# Patient Record
Sex: MALE | Race: OTHER | Employment: FULL TIME | URBAN - METROPOLITAN AREA
[De-identification: names, ages, dates, MRNs, and addresses within clinical notes are randomized per-mention and may not be internally consistent; named-entity substitution may affect disease eponyms.]

---

## 2022-11-17 ENCOUNTER — APPOINTMENT (OUTPATIENT)
Dept: GENERAL RADIOLOGY | Facility: HOSPITAL | Age: 67
End: 2022-11-17
Attending: EMERGENCY MEDICINE
Payer: COMMERCIAL

## 2022-11-17 ENCOUNTER — HOSPITAL ENCOUNTER (EMERGENCY)
Facility: HOSPITAL | Age: 67
Discharge: HOME | End: 2022-11-18
Attending: EMERGENCY MEDICINE
Payer: COMMERCIAL

## 2022-11-17 DIAGNOSIS — T18.9XXA SWALLOWED FOREIGN BODY, INITIAL ENCOUNTER: Primary | ICD-10-CM

## 2022-11-17 PROCEDURE — 99283 EMERGENCY DEPT VISIT LOW MDM: CPT

## 2022-11-17 PROCEDURE — 71045 X-RAY EXAM CHEST 1 VIEW: CPT | Performed by: EMERGENCY MEDICINE

## 2022-11-17 PROCEDURE — 74018 RADEX ABDOMEN 1 VIEW: CPT | Performed by: EMERGENCY MEDICINE

## 2022-11-17 RX ORDER — GLIPIZIDE 10 MG/1
TABLET ORAL
COMMUNITY

## 2022-11-18 VITALS
SYSTOLIC BLOOD PRESSURE: 149 MMHG | RESPIRATION RATE: 17 BRPM | DIASTOLIC BLOOD PRESSURE: 83 MMHG | WEIGHT: 195 LBS | HEART RATE: 69 BPM | TEMPERATURE: 97 F | OXYGEN SATURATION: 97 %

## 2022-11-18 NOTE — ED INITIAL ASSESSMENT (HPI)
Patient presents to the ED stating that he swallowed his upper partial dental bridge a couple hours ago. Pt able to speak in complete sentences. Pt in no respiratory distress. Pt does not feel like it is stuck in his throat.

## 2022-12-16 ENCOUNTER — OFFICE VISIT (OUTPATIENT)
Dept: GASTROENTEROLOGY | Facility: CLINIC | Age: 67
End: 2022-12-16
Payer: COMMERCIAL

## 2022-12-16 ENCOUNTER — TELEPHONE (OUTPATIENT)
Dept: GASTROENTEROLOGY | Facility: CLINIC | Age: 67
End: 2022-12-16

## 2022-12-16 VITALS
BODY MASS INDEX: 28.53 KG/M2 | HEART RATE: 75 BPM | DIASTOLIC BLOOD PRESSURE: 88 MMHG | HEIGHT: 69 IN | SYSTOLIC BLOOD PRESSURE: 137 MMHG | WEIGHT: 192.63 LBS

## 2022-12-16 DIAGNOSIS — Z12.11 COLON CANCER SCREENING: Primary | ICD-10-CM

## 2022-12-16 DIAGNOSIS — Z12.11 ENCOUNTER FOR SCREENING COLONOSCOPY: Primary | ICD-10-CM

## 2022-12-16 DIAGNOSIS — Z86.010 PERSONAL HISTORY OF COLONIC POLYPS: ICD-10-CM

## 2022-12-16 PROCEDURE — 3008F BODY MASS INDEX DOCD: CPT | Performed by: INTERNAL MEDICINE

## 2022-12-16 PROCEDURE — S0285 CNSLT BEFORE SCREEN COLONOSC: HCPCS | Performed by: INTERNAL MEDICINE

## 2022-12-16 PROCEDURE — 3075F SYST BP GE 130 - 139MM HG: CPT | Performed by: INTERNAL MEDICINE

## 2022-12-16 PROCEDURE — 3079F DIAST BP 80-89 MM HG: CPT | Performed by: INTERNAL MEDICINE

## 2022-12-16 RX ORDER — PIOGLITAZONEHYDROCHLORIDE 45 MG/1
45 TABLET ORAL DAILY
COMMUNITY

## 2022-12-16 RX ORDER — EXENATIDE 2 MG/.85ML
2 INJECTION, SUSPENSION, EXTENDED RELEASE SUBCUTANEOUS WEEKLY
COMMUNITY
Start: 2022-11-16

## 2022-12-16 RX ORDER — ERGOCALCIFEROL (VITAMIN D2) 1250 MCG
50000 CAPSULE ORAL WEEKLY
COMMUNITY

## 2022-12-16 RX ORDER — LISINOPRIL 10 MG/1
10 TABLET ORAL DAILY
COMMUNITY

## 2022-12-16 RX ORDER — ASPIRIN 81 MG/1
81 TABLET ORAL DAILY
COMMUNITY

## 2022-12-16 RX ORDER — LOVASTATIN 20 MG/1
20 TABLET ORAL NIGHTLY
COMMUNITY
Start: 2022-10-06

## 2022-12-16 RX ORDER — POLYETHYLENE GLYCOL 3350, SODIUM SULFATE ANHYDROUS, SODIUM BICARBONATE, SODIUM CHLORIDE, POTASSIUM CHLORIDE 236; 22.74; 6.74; 5.86; 2.97 G/4L; G/4L; G/4L; G/4L; G/4L
4 POWDER, FOR SOLUTION ORAL ONCE
Qty: 1 EACH | Refills: 0 | Status: SHIPPED | OUTPATIENT
Start: 2022-12-16 | End: 2022-12-16

## 2023-05-04 ENCOUNTER — SURGERY CENTER DOCUMENTATION (OUTPATIENT)
Dept: SURGERY | Age: 68
End: 2023-05-04

## 2023-05-05 ENCOUNTER — MED REC SCAN ONLY (OUTPATIENT)
Facility: CLINIC | Age: 68
End: 2023-05-05

## 2023-05-30 ENCOUNTER — OFFICE VISIT (OUTPATIENT)
Dept: INTERNAL MEDICINE CLINIC | Facility: CLINIC | Age: 68
End: 2023-05-30

## 2023-05-30 VITALS
HEIGHT: 69 IN | OXYGEN SATURATION: 96 % | DIASTOLIC BLOOD PRESSURE: 78 MMHG | WEIGHT: 198 LBS | HEART RATE: 74 BPM | SYSTOLIC BLOOD PRESSURE: 132 MMHG | BODY MASS INDEX: 29.33 KG/M2

## 2023-05-30 DIAGNOSIS — Z13.0 SCREENING FOR DEFICIENCY ANEMIA: ICD-10-CM

## 2023-05-30 DIAGNOSIS — E78.2 MIXED HYPERLIPIDEMIA: ICD-10-CM

## 2023-05-30 DIAGNOSIS — E55.9 VITAMIN D DEFICIENCY: ICD-10-CM

## 2023-05-30 DIAGNOSIS — Z12.5 SCREENING FOR PROSTATE CANCER: ICD-10-CM

## 2023-05-30 DIAGNOSIS — Z13.89 SCREENING FOR NEPHROPATHY: ICD-10-CM

## 2023-05-30 DIAGNOSIS — E53.8 VITAMIN B12 DEFICIENCY: ICD-10-CM

## 2023-05-30 DIAGNOSIS — Z00.00 ANNUAL PHYSICAL EXAM: Primary | ICD-10-CM

## 2023-05-30 DIAGNOSIS — Z13.29 SCREENING FOR THYROID DISORDER: ICD-10-CM

## 2023-05-30 DIAGNOSIS — E11.65 TYPE 2 DIABETES MELLITUS WITH HYPERGLYCEMIA, WITHOUT LONG-TERM CURRENT USE OF INSULIN (HCC): ICD-10-CM

## 2023-05-30 DIAGNOSIS — I10 PRIMARY HYPERTENSION: ICD-10-CM

## 2023-05-30 PROCEDURE — 3078F DIAST BP <80 MM HG: CPT | Performed by: INTERNAL MEDICINE

## 2023-05-30 PROCEDURE — 3075F SYST BP GE 130 - 139MM HG: CPT | Performed by: INTERNAL MEDICINE

## 2023-05-30 PROCEDURE — 99387 INIT PM E/M NEW PAT 65+ YRS: CPT | Performed by: INTERNAL MEDICINE

## 2023-05-30 PROCEDURE — 3008F BODY MASS INDEX DOCD: CPT | Performed by: INTERNAL MEDICINE

## 2023-05-30 RX ORDER — INSULIN DEGLUDEC INJECTION 100 U/ML
INJECTION, SOLUTION SUBCUTANEOUS
COMMUNITY
Start: 2023-04-14 | End: 2023-05-30

## 2023-05-30 RX ORDER — INSULIN DEGLUDEC INJECTION 100 U/ML
25 INJECTION, SOLUTION SUBCUTANEOUS DAILY
Qty: 22.5 ML | Refills: 3 | Status: SHIPPED | OUTPATIENT
Start: 2023-05-30 | End: 2023-08-28

## 2023-05-30 NOTE — PATIENT INSTRUCTIONS
- You were seen in clinic for regular annual check-up. We have ordered labs for you and we will call you with the results. Please obtain the bloodwork fasting for 12 hours. OK to drink water the day of your blood draw. We have the lab downstairs on the first floor. No appointment is necessary. Lab hours are Monday-Friday 7:30 AM to 4:45 PM.  There may be Saturday hours 7 AM-11:00 AM as well. Otherwise you can obtain the blood tests on the weekends at the main hospital or local immediate care/urgent care within the [facility]. -We will request the records from your prior PCP, Dr. Lolita Martins  - Let's plan to increase your Tresiba to 25 U once a day. We discussed the possibility of starting short acting insulin in the future if needed. Let's continue with your other medications in the meantime  -You may be due for eye examination for monitoring of yearly diabetic eye checks - please make an appointment with Dr. Sd Serra  - Your next colonoscopy will be due in 10 years with Dr. Drake Lee  -Please continue checking your blood pressures at home and notify us if they are increasing   - please continue checking your blood sugars at home and notify us if they are increasing  - Vaccines may be due for: Tetanus/Tdap,We recommended checking with your insurance for coverage of Shingrix, a 2-dose shingles vaccine that can be obtained at the pharmacy if there is adequate coverage through your insurance plan. - Please continue to eat a varied diet including recommended servings of vegetables, fruits, and low fat dairy. Minimize high saturated fats (such as fast foods) and high sugar intake (such as soda)  - We recommend 150 minutes of moderate intensity exercise (brisk walking, swimming) weekly to maintain your current weight. Targeted weight loss will require more vigorous exercise or more than 150 minutes/week.     Return to clinic in 3-4 months for follow-up

## 2024-01-10 ENCOUNTER — TELEPHONE (OUTPATIENT)
Dept: INTERNAL MEDICINE CLINIC | Facility: CLINIC | Age: 69
End: 2024-01-10

## 2024-01-10 NOTE — TELEPHONE ENCOUNTER
Patient left a voicemail requesting refills    Metformin  Farxiga  Freestyle Amena 2 Sensor  Insulin    [facility]

## 2024-01-11 ENCOUNTER — TELEPHONE (OUTPATIENT)
Dept: INTERNAL MEDICINE CLINIC | Facility: CLINIC | Age: 69
End: 2024-01-11

## 2024-01-12 RX ORDER — LOVASTATIN 20 MG/1
20 TABLET ORAL NIGHTLY
Qty: 90 TABLET | Refills: 3 | Status: SHIPPED | OUTPATIENT
Start: 2024-01-12

## 2024-01-12 RX ORDER — INSULIN DEGLUDEC INJECTION 100 U/ML
25 INJECTION, SOLUTION SUBCUTANEOUS DAILY
Qty: 24 ML | Refills: 3 | Status: SHIPPED | OUTPATIENT
Start: 2024-01-12 | End: 2025-01-30

## 2024-01-12 RX ORDER — LISINOPRIL 10 MG/1
10 TABLET ORAL DAILY
Qty: 90 TABLET | Refills: 3 | Status: SHIPPED | OUTPATIENT
Start: 2024-01-12

## 2024-02-12 ENCOUNTER — LAB ENCOUNTER (OUTPATIENT)
Dept: LAB | Age: 69
End: 2024-02-12
Attending: INTERNAL MEDICINE
Payer: COMMERCIAL

## 2024-02-12 DIAGNOSIS — E11.65 TYPE 2 DIABETES MELLITUS WITH HYPERGLYCEMIA, WITHOUT LONG-TERM CURRENT USE OF INSULIN (HCC): ICD-10-CM

## 2024-02-12 DIAGNOSIS — E55.9 VITAMIN D DEFICIENCY: ICD-10-CM

## 2024-02-12 DIAGNOSIS — E53.8 VITAMIN B12 DEFICIENCY: ICD-10-CM

## 2024-02-12 DIAGNOSIS — Z13.29 SCREENING FOR THYROID DISORDER: ICD-10-CM

## 2024-02-12 DIAGNOSIS — Z00.00 ANNUAL PHYSICAL EXAM: ICD-10-CM

## 2024-02-12 DIAGNOSIS — Z13.89 SCREENING FOR NEPHROPATHY: ICD-10-CM

## 2024-02-12 DIAGNOSIS — Z12.5 SCREENING FOR PROSTATE CANCER: ICD-10-CM

## 2024-02-12 DIAGNOSIS — Z13.0 SCREENING FOR DEFICIENCY ANEMIA: ICD-10-CM

## 2024-02-12 DIAGNOSIS — E78.2 MIXED HYPERLIPIDEMIA: ICD-10-CM

## 2024-02-12 LAB
ALBUMIN SERPL-MCNC: 4.3 G/DL (ref 3.2–4.8)
ALBUMIN/GLOB SERPL: 1.6 {RATIO} (ref 1–2)
ALP LIVER SERPL-CCNC: 62 U/L
ALT SERPL-CCNC: 9 U/L
ANION GAP SERPL CALC-SCNC: 7 MMOL/L (ref 0–18)
AST SERPL-CCNC: 14 U/L (ref ?–34)
BASOPHILS # BLD AUTO: 0.02 X10(3) UL (ref 0–0.2)
BASOPHILS NFR BLD AUTO: 0.3 %
BILIRUB SERPL-MCNC: 0.8 MG/DL (ref 0.2–1.1)
BILIRUB UR QL: NEGATIVE
BUN BLD-MCNC: 20 MG/DL (ref 9–23)
BUN/CREAT SERPL: 18.7 (ref 10–20)
CALCIUM BLD-MCNC: 9.7 MG/DL (ref 8.7–10.4)
CHLORIDE SERPL-SCNC: 108 MMOL/L (ref 98–112)
CHOLEST SERPL-MCNC: 154 MG/DL (ref ?–200)
CLARITY UR: CLEAR
CO2 SERPL-SCNC: 29 MMOL/L (ref 21–32)
COMPLEXED PSA SERPL-MCNC: 3.12 NG/ML (ref ?–4)
CREAT BLD-MCNC: 1.07 MG/DL
CREAT UR-SCNC: 55.2 MG/DL
DEPRECATED RDW RBC AUTO: 44 FL (ref 35.1–46.3)
EGFRCR SERPLBLD CKD-EPI 2021: 76 ML/MIN/1.73M2 (ref 60–?)
EOSINOPHIL # BLD AUTO: 0.22 X10(3) UL (ref 0–0.7)
EOSINOPHIL NFR BLD AUTO: 3.7 %
ERYTHROCYTE [DISTWIDTH] IN BLOOD BY AUTOMATED COUNT: 12.8 % (ref 11–15)
EST. AVERAGE GLUCOSE BLD GHB EST-MCNC: 217 MG/DL (ref 68–126)
FASTING PATIENT LIPID ANSWER: YES
FASTING STATUS PATIENT QL REPORTED: YES
GLOBULIN PLAS-MCNC: 2.7 G/DL (ref 2.8–4.4)
GLUCOSE BLD-MCNC: 163 MG/DL (ref 70–99)
GLUCOSE UR-MCNC: >1000 MG/DL
HBA1C MFR BLD: 9.2 % (ref ?–5.7)
HCT VFR BLD AUTO: 45 %
HDLC SERPL-MCNC: 51 MG/DL (ref 40–59)
HGB BLD-MCNC: 14.4 G/DL
HGB UR QL STRIP.AUTO: NEGATIVE
IMM GRANULOCYTES # BLD AUTO: 0.01 X10(3) UL (ref 0–1)
IMM GRANULOCYTES NFR BLD: 0.2 %
KETONES UR-MCNC: NEGATIVE MG/DL
LDLC SERPL CALC-MCNC: 86 MG/DL (ref ?–100)
LEUKOCYTE ESTERASE UR QL STRIP.AUTO: NEGATIVE
LYMPHOCYTES # BLD AUTO: 1.46 X10(3) UL (ref 1–4)
LYMPHOCYTES NFR BLD AUTO: 24.7 %
MCH RBC QN AUTO: 30 PG (ref 26–34)
MCHC RBC AUTO-ENTMCNC: 32 G/DL (ref 31–37)
MCV RBC AUTO: 93.8 FL
MICROALBUMIN UR-MCNC: 2.4 MG/DL
MICROALBUMIN/CREAT 24H UR-RTO: 43.5 UG/MG (ref ?–30)
MONOCYTES # BLD AUTO: 0.52 X10(3) UL (ref 0.1–1)
MONOCYTES NFR BLD AUTO: 8.8 %
NEUTROPHILS # BLD AUTO: 3.67 X10 (3) UL (ref 1.5–7.7)
NEUTROPHILS # BLD AUTO: 3.67 X10(3) UL (ref 1.5–7.7)
NEUTROPHILS NFR BLD AUTO: 62.3 %
NITRITE UR QL STRIP.AUTO: NEGATIVE
NONHDLC SERPL-MCNC: 103 MG/DL (ref ?–130)
OSMOLALITY SERPL CALC.SUM OF ELEC: 304 MOSM/KG (ref 275–295)
PH UR: 5 [PH] (ref 5–8)
PLATELET # BLD AUTO: 241 10(3)UL (ref 150–450)
POTASSIUM SERPL-SCNC: 4.3 MMOL/L (ref 3.5–5.1)
PROT SERPL-MCNC: 7 G/DL (ref 5.7–8.2)
PROT UR-MCNC: NEGATIVE MG/DL
RBC # BLD AUTO: 4.8 X10(6)UL
SODIUM SERPL-SCNC: 144 MMOL/L (ref 136–145)
SP GR UR STRIP: 1.03 (ref 1–1.03)
TRIGL SERPL-MCNC: 92 MG/DL (ref 30–149)
TSI SER-ACNC: 1.91 MIU/ML (ref 0.55–4.78)
UROBILINOGEN UR STRIP-ACNC: NORMAL
VIT B12 SERPL-MCNC: >2000 PG/ML (ref 211–911)
VIT D+METAB SERPL-MCNC: 69.8 NG/ML (ref 30–100)
VLDLC SERPL CALC-MCNC: 15 MG/DL (ref 0–30)
WBC # BLD AUTO: 5.9 X10(3) UL (ref 4–11)

## 2024-02-12 PROCEDURE — 85025 COMPLETE CBC W/AUTO DIFF WBC: CPT

## 2024-02-12 PROCEDURE — 3061F NEG MICROALBUMINURIA REV: CPT | Performed by: INTERNAL MEDICINE

## 2024-02-12 PROCEDURE — 82570 ASSAY OF URINE CREATININE: CPT

## 2024-02-12 PROCEDURE — 82306 VITAMIN D 25 HYDROXY: CPT

## 2024-02-12 PROCEDURE — 83036 HEMOGLOBIN GLYCOSYLATED A1C: CPT

## 2024-02-12 PROCEDURE — 82607 VITAMIN B-12: CPT

## 2024-02-12 PROCEDURE — 3046F HEMOGLOBIN A1C LEVEL >9.0%: CPT | Performed by: INTERNAL MEDICINE

## 2024-02-12 PROCEDURE — 81003 URINALYSIS AUTO W/O SCOPE: CPT

## 2024-02-12 PROCEDURE — 82043 UR ALBUMIN QUANTITATIVE: CPT

## 2024-02-12 PROCEDURE — 80053 COMPREHEN METABOLIC PANEL: CPT

## 2024-02-12 PROCEDURE — 36415 COLL VENOUS BLD VENIPUNCTURE: CPT

## 2024-02-12 PROCEDURE — 80061 LIPID PANEL: CPT

## 2024-02-12 PROCEDURE — 3060F POS MICROALBUMINURIA REV: CPT | Performed by: INTERNAL MEDICINE

## 2024-02-12 PROCEDURE — 84443 ASSAY THYROID STIM HORMONE: CPT

## 2024-02-18 NOTE — PATIENT INSTRUCTIONS
Seen in clinic today for follow-up.  Today, we focused on your type 2 diabetes as it remains poorly controlled at this time with A1c 9.2%  - We discussed increasing your insulin and will increase to tresiba 20 units twice a day  - You may be due for eye examination with Dr. Wells  - Continue checking your blood sugars at home  - We have referred you to endocrinology, Dr. Kingsley    Continue checking your blood pressures at home, notify us if increasing    We recommended checking with your insurance for coverage of Shingrix, a 2-dose shingles vaccine that can be obtained at the pharmacy if there is adequate coverage through your insurance plan.    Return to clinic in 3-4 months for annual physical examination

## 2024-02-18 NOTE — PROGRESS NOTES
Chief Complaint:   Chief Complaint   Patient presents with    Checkup     Patient c/o neck pain since the weekend with [place] and dizziness after waking up.  Pain 1/10 to neck           HPI:     Mr. RAMOS is a 68 year old male PMHX type 2 diabetes, hypertension, hyperlipidemia, glaucoma coming in for follow-up.    He had an episode on the neck. Twisted and the room was spinning. He had neck pain. This was the first time this ever has had this.  Still has some residual neck pain, more so to the right lateral area.  Still has good range of motion, no changes in vision.  He does have hearing aids that he got from a year ago but this has been stable.    He has no carbs, and exercises frequently. 3-4x exercises. He is close to achieving his .     He is using a freestyle eloisa 1x - only 1 sugar.  He is a bit frustrated as he is doing the right things in terms of exercise, minimizing carbs but has not had difficulty with controlling his sugars.    Past Medical History:   Diagnosis Date    Diabetes (formerly [facility])     Diabetes 1.5, managed as type 2 (formerly [facility])     Essential hypertension     Hyperlipidemia      Past Surgical History:   Procedure Laterality Date    COLONOSCOPY      in [place]    EGD      25 yrs ago at [facility]     Social History:  Social History     Socioeconomic History    Marital status: Single   Tobacco Use    Smoking status: Never    Smokeless tobacco: Never   Vaping Use    Vaping Use: Never used   Substance and Sexual Activity    Alcohol use: Never    Drug use: Never     Family History:  Family History   Problem Relation Age of Onset    Heart Disease Mother         late 80s    Diabetes Mother     Other (Other) Father         blood disorder: AMDS    Diabetes Maternal Grandmother      Allergies:  No Known Allergies  Current Meds:  Current Outpatient Medications   Medication Sig Dispense Refill    metFORMIN HCl 1000 MG Oral Tab Take 1 tablet (1,000 mg total) by mouth 2 (two) times daily with meals. 180  tablet 3    dapagliflozin 10 MG Oral Tab Farxiga 10 mg tablet. TAKE 1 TABLET BY MOUTH ONCE DAILY 90 tablet 3    Continuous Blood Gluc Sensor (FREESTYLE NORBERTO 2 SENSOR) Does not apply Misc Apply 1 Application topically every 14 (fourteen) days. 12 each 3    lisinopril 10 MG Oral Tab Take 1 tablet (10 mg total) by mouth daily. 90 tablet 3    Lovastatin 20 MG Oral Tab Take 1 tablet (20 mg total) by mouth nightly. 90 tablet 3    Insulin Degludec (TRESIBA FLEXTOUCH) 100 UNIT/ML Subcutaneous Solution Pen-injector Inject 0.25 mL (25 Units total) into the skin daily. 24 mL 3    Cyanocobalamin (B-12 OR) Take by mouth.      aspirin 81 MG Oral Tab EC Take 1 tablet (81 mg total) by mouth daily.      ergocalciferol 1.25 MG (41834 UT) Oral Cap Take 1 capsule (50,000 Units total) by mouth once a week.        Counseling given: Not Answered       REVIEW OF SYSTEMS:   Positive Findings indicated in BOLD    Constitutional: Fever, Chills, Weight Gain, Weight Loss, Night Sweats, Fatigue, Malaise  ENT/Mouth:  Hearing Changes, Ear Pain, Nasal Congestion, Sinus Pain, Hoarseness, Sore throat, Rhinorrhea, Swallowing Difficulty  Eyes: Eye Pain, Swelling, Redness, Foreign Body, Discharge, Vision Changes  Cardiovascular: Chest Pain, SOB, PND, Dyspnea on Exertion, Orthopnea, Claudication, Edema, Palpitations  Respiratory: Cough, Sputum, Wheezing, Shortness of breath  Gastrointestinal: Nausea, Vomiting, Diarrhea, Constipation, Pain, Heartburn, Dysphagia, Bloody stools, Tarry stools  Genitourinary: Dysmenorrhea, Dysuria, Urinary Frequency, Hematuria, Urinary Incontinence, Urgency,  Flank Pain  Musculoskeletal: Arthralgias, Myalgias, Joint Swelling, Joint Stiffness, Back Pain, Neck Pain  Integumentary: Skin Lesions, Pruritis, Hair Changes, Jaundice, Nail changes  Neuro: Weakness, Numbness, Paresthesias, Loss of Consciousness, Syncope, Dizziness, Headache, Falls  Psych: Anxiety, Depression, Insomnia, Suicidal Ideation, Homicidal ideation, Memory  Changes  Heme/Lymph: Bruising, Bleeding, Lymphadenopathy  Endocrine: Polyuria, Polydipsia, Temperature Intolerance    EXAM:   Vital Signs:  Blood pressure 128/68, pulse 71, temperature 98.2 °F (36.8 °C), height 5' 9\" (1.753 m), weight 202 lb (91.6 kg), SpO2 98%.     Constitutional: No acute distress. Alert and oriented x 3.  Eyes: EOMI, PERRLA, clear sclera b/l  HENT: NCAT, Moist mucous membranes, Oropharynx without erythema or exudates  Neck: No JVD, no thyromegaly; Full AROM  Cardiovascular: S1, S2, no S3, no S4, Regular rate and rhythm, No murmurs/gallops/rubs.   Vascular: Equal pulses 2+ carotids no bruits or thrills/radial/DP/PT bilaterally  Respiratory: Clear to auscultation bilaterally.  No wheezes/rales/rhonchi  Gastrointestinal: Soft, nontender, nondistended. Positive bowel sounds x 4. No rebound tenderness. No hepatomegaly, No splenomegaly  Genitourinary: No CVA tenderness bilaterally  Neurologic: No focal neurological deficits, CN II-XII intact  Musculoskeletal: Full range of motion of all extremities, no clubbing/swelling/edema  Skin: No lesions, No erythema, no jaundice, Cap Refill < 2s  Psychiatric: Appropriate mood and affect  Heme/Lymph/Immune: No cervical LAD      DATA REVIEWED   Labs:  Recent Results (from the past 8760 hour(s))   Comp Metabolic Panel (14)    Collection Time: 02/12/24  8:17 AM   Result Value Ref Range    Glucose 163 (H) 70 - 99 mg/dL    Sodium 144 136 - 145 mmol/L    Potassium 4.3 3.5 - 5.1 mmol/L    Chloride 108 98 - 112 mmol/L    CO2 29.0 21.0 - 32.0 mmol/L    Anion Gap 7 0 - 18 mmol/L    BUN 20 9 - 23 mg/dL    Creatinine 1.07 0.70 - 1.30 mg/dL    BUN/CREA Ratio 18.7 10.0 - 20.0    Calcium, Total 9.7 8.7 - 10.4 mg/dL    Calculated Osmolality 304 (H) 275 - 295 mOsm/kg    eGFR-Cr 76 >=60 mL/min/1.73m2    ALT 9 (L) 10 - 49 U/L    AST 14 <=34 U/L    Alkaline Phosphatase 62 45 - 117 U/L    Bilirubin, Total 0.8 0.2 - 1.1 mg/dL    Total Protein 7.0 5.7 - 8.2 g/dL    Albumin 4.3 3.2 -  4.8 g/dL    Globulin  2.7 (L) 2.8 - 4.4 g/dL    A/G Ratio 1.6 1.0 - 2.0    Patient Fasting for CMP? Yes      *Note: Due to a large number of results and/or encounters for the requested time period, some results have not been displayed. A complete set of results can be found in Results Review.       Recent Results (from the past 8760 hour(s))   CBC W/ DIFFERENTIAL    Collection Time: 02/12/24  8:17 AM   Result Value Ref Range    WBC 5.9 4.0 - 11.0 x10(3) uL    RBC 4.80 3.80 - 5.80 x10(6)uL    HGB 14.4 13.0 - 17.5 g/dL    HCT 45.0 39.0 - 53.0 %    MCV 93.8 80.0 - 100.0 fL    MCH 30.0 26.0 - 34.0 pg    MCHC 32.0 31.0 - 37.0 g/dL    RDW-SD 44.0 35.1 - 46.3 fL    RDW 12.8 11.0 - 15.0 %    .0 150.0 - 450.0 10(3)uL    Neutrophil Absolute Prelim 3.67 1.50 - 7.70 x10 (3) uL    Neutrophil Absolute 3.67 1.50 - 7.70 x10(3) uL    Lymphocyte Absolute 1.46 1.00 - 4.00 x10(3) uL    Monocyte Absolute 0.52 0.10 - 1.00 x10(3) uL    Eosinophil Absolute 0.22 0.00 - 0.70 x10(3) uL    Basophil Absolute 0.02 0.00 - 0.20 x10(3) uL    Immature Granulocyte Absolute 0.01 0.00 - 1.00 x10(3) uL    Neutrophil % 62.3 %    Lymphocyte % 24.7 %    Monocyte % 8.8 %    Eosinophil % 3.7 %    Basophil % 0.3 %    Immature Granulocyte % 0.2 %     *Note: Due to a large number of results and/or encounters for the requested time period, some results have not been displayed. A complete set of results can be found in Results Review.           ASSESSMENT AND PLAN:     DM2  Recent A1c:   HgbA1C (%)   Date Value   02/12/2024 9.2 (H)     Recent urine microalbumin: No components found for: \"MICROALB/CREAT RATIO\"  Current medications: metformin 1000 mg, dapagliflozin 10 mg once a day, pioglitazone 45 mg daily, Tresiba 25 units once a day  Eye exam: Will see Dr. Wells  Foot exam: Check feet daily  - Microalbumin to creatinine ratio 43.5, should improve with greater diabetic control  -As he is doing all the right things with nutrition and exercise, we will try  to increase Tresiba to 20 units twice a day which she is amenable to  - Will refer to endocrinology, Dr. Kingsley     Hypertension  -Blood pressure today at goal  - Check blood pressures at home  - Continue with home lisinopril 10 mg daily     Hyperlipidemia  - Repeat fasting lipid panel overall well-controlled  - Continue with lovastatin 20 mg nightly     Glaucoma  Prior history  -Going to follow-up with ophthalmology     Vitamin B-12 deficiency  Noted prior history  - Vitamin B12 greater than 2000, should take vitamin B12 every other day     Vitamin D deficiency  Noted prior history  - Vitamin D at goal             Orders This Visit:  No orders of the defined types were placed in this encounter.      Meds This Visit:  Requested Prescriptions      No prescriptions requested or ordered in this encounter       Imaging & Referrals:  None     Health Maintenance  May be due for diabetic eye examination  Due for shingles vaccine    Spent 30 minutes obtaining history, evaluating patient, discussing treatment options, diet, exercise, review of available labs and radiology reports, and completing documentation.       Return to clinic in 3-4 months for annual physical examination    Fredy Jacobson MD, 02/19/24, 4:15 PM

## 2024-02-19 ENCOUNTER — OFFICE VISIT (OUTPATIENT)
Dept: INTERNAL MEDICINE CLINIC | Facility: CLINIC | Age: 69
End: 2024-02-19

## 2024-02-19 VITALS
HEART RATE: 71 BPM | HEIGHT: 69 IN | BODY MASS INDEX: 29.92 KG/M2 | OXYGEN SATURATION: 98 % | SYSTOLIC BLOOD PRESSURE: 128 MMHG | WEIGHT: 202 LBS | TEMPERATURE: 98 F | DIASTOLIC BLOOD PRESSURE: 68 MMHG

## 2024-02-19 DIAGNOSIS — E53.8 VITAMIN B12 DEFICIENCY: ICD-10-CM

## 2024-02-19 DIAGNOSIS — R42 VERTIGO: ICD-10-CM

## 2024-02-19 DIAGNOSIS — E55.9 VITAMIN D DEFICIENCY: ICD-10-CM

## 2024-02-19 DIAGNOSIS — E78.2 MIXED HYPERLIPIDEMIA: ICD-10-CM

## 2024-02-19 DIAGNOSIS — E11.65 TYPE 2 DIABETES MELLITUS WITH HYPERGLYCEMIA, WITHOUT LONG-TERM CURRENT USE OF INSULIN (HCC): Primary | ICD-10-CM

## 2024-02-19 DIAGNOSIS — M54.2 ACUTE NECK PAIN: ICD-10-CM

## 2024-02-19 DIAGNOSIS — I10 PRIMARY HYPERTENSION: ICD-10-CM

## 2024-02-19 PROCEDURE — 3074F SYST BP LT 130 MM HG: CPT | Performed by: INTERNAL MEDICINE

## 2024-02-19 PROCEDURE — 3008F BODY MASS INDEX DOCD: CPT | Performed by: INTERNAL MEDICINE

## 2024-02-19 PROCEDURE — 3078F DIAST BP <80 MM HG: CPT | Performed by: INTERNAL MEDICINE

## 2024-02-19 PROCEDURE — 99214 OFFICE O/P EST MOD 30 MIN: CPT | Performed by: INTERNAL MEDICINE

## 2024-02-19 RX ORDER — INSULIN DEGLUDEC INJECTION 100 U/ML
20 INJECTION, SOLUTION SUBCUTANEOUS 2 TIMES DAILY
Qty: 36 ML | Refills: 1 | Status: SHIPPED | OUTPATIENT
Start: 2024-02-19 | End: 2024-08-17

## 2024-02-27 ENCOUNTER — TELEPHONE (OUTPATIENT)
Dept: INTERNAL MEDICINE CLINIC | Facility: CLINIC | Age: 69
End: 2024-02-27

## 2024-02-29 ENCOUNTER — PATIENT MESSAGE (OUTPATIENT)
Dept: ADMINISTRATIVE | Age: 69
End: 2024-02-29

## 2024-02-29 NOTE — TELEPHONE ENCOUNTER
Sherri  online to initiate authorization    CTA BRAIN + CTA CAROTIDS (CPT=70496/01221)        Referral #: [id number]      Scheduled For: 03/01/2024    Status: pending authorization > To discuss this case with the reviewer, contact Sherri at [phone]  .    Use Reference Case Number: [id number]             Clinical notes sent for review.     Patient notified of pending status via [employer].     Appt Desk > Noted

## 2024-03-01 ENCOUNTER — TELEPHONE (OUTPATIENT)
Dept: INTERNAL MEDICINE CLINIC | Facility: CLINIC | Age: 69
End: 2024-03-01

## 2024-03-01 ENCOUNTER — PATIENT MESSAGE (OUTPATIENT)
Dept: INTERNAL MEDICINE CLINIC | Facility: CLINIC | Age: 69
End: 2024-03-01

## 2024-03-01 NOTE — TELEPHONE ENCOUNTER
To CHARU Pardo..    I called and spoke with Jonathan at [facility] T# [phone] and inquired why Pt's CTA Brain and CTA Carotids exam was denied today. Per Jonathan diagnosis of dizziness will not suffice. He stated a peer to peer review is required and you can call t# [phone]. You need to include diagnosis of blockage he stated

## 2024-03-01 NOTE — TELEPHONE ENCOUNTER
From: Bg Johnson  To: Fredy Jacobson  Sent: 3/1/2024  1:31 PM CST  Subject: Test declined    My insurance company declined to approve test trying to figure out why

## 2024-03-01 NOTE — TELEPHONE ENCOUNTER
CTA BRAIN + CTA CAROTIDS (CPT=70496/90643)             Referral #: [id number]       Scheduled For:  03/06/2024     Status: Denied > To discuss this case with the reviewer, contact Sherri at [phone]  .     Use Reference Case Number: [id number]       Angiography, Head Rational:  Your doctor told us that you have dizziness. Your doctor ordered a test that takes pictures of the blood vessels in your head. This test is needed when your doctor is looking for certain conditions. These might include a blockage of the blood vessel, bleeding or a stroke. We reviewed the notes we have. The notes do not show that your doctor is looking for any of these conditions. Based on the information we have, this test is not medically necessary. We used [employer] Clinical Guideline titled Vascular Imaging to make this decision. You may view this guideline at [email or web address]-guidelines-radiology.    Angiography, Neck - CT  Rational: Your doctor told us that you have neck pain and dizziness. Your doctor ordered a test that takes pictures of the blood vessels in your neck. This test should be used when your doctor is looking for certain conditions. These might include a blockage of the blood vessel, or stroke. We reviewed the notes we have. The notes do not show that your doctor is looking for any of these conditions. Based on the information we have, this test is not medically necessary. We used [employer] Clinical Guideline titled Vascular Imaging to make this decision. You may view this guideline at [email or web address]/mbm-guidelines-radiology.      Notified clinical staff for follow-up, a copy of the denial letter is filed under the MEDIA tab, un-check \" Clinical Info Only \" to view the determination letter for denial rational and appeal process.     Patient notified of adverse determination via [email or web address].     Appt Desk > Noted

## 2024-03-01 NOTE — TELEPHONE ENCOUNTER
Patient called and states someone from Dr Jacobson office called him but did not leave a message.    Please advise

## 2024-03-01 NOTE — TELEPHONE ENCOUNTER
Called peer to peer line - Dr. Becerra     D/w Dr. Becerra    Authorization: [id number] both head and neck CTA  - Feburay 29-April 28 2024.    Received authorization, needed more clinical information as the clinical note was not fully received.  Discussed that this is concern for vertebral artery dissection.    To the pool, not sure who should receive this information?  Please also notify the patient that it has been approved

## 2024-03-06 ENCOUNTER — HOSPITAL ENCOUNTER (OUTPATIENT)
Dept: CT IMAGING | Facility: HOSPITAL | Age: 69
Discharge: HOME | End: 2024-03-06
Attending: INTERNAL MEDICINE
Payer: COMMERCIAL

## 2024-03-06 DIAGNOSIS — E11.65 TYPE 2 DIABETES MELLITUS WITH HYPERGLYCEMIA, WITHOUT LONG-TERM CURRENT USE OF INSULIN (HCC): ICD-10-CM

## 2024-03-06 DIAGNOSIS — R42 VERTIGO: ICD-10-CM

## 2024-03-06 DIAGNOSIS — M54.2 ACUTE NECK PAIN: ICD-10-CM

## 2024-03-06 PROCEDURE — 70496 CT ANGIOGRAPHY HEAD: CPT | Performed by: INTERNAL MEDICINE

## 2024-03-06 PROCEDURE — 70498 CT ANGIOGRAPHY NECK: CPT | Performed by: INTERNAL MEDICINE

## 2024-03-07 ENCOUNTER — TELEPHONE (OUTPATIENT)
Dept: INTERNAL MEDICINE CLINIC | Facility: CLINIC | Age: 69
End: 2024-03-07

## 2024-03-07 RX ORDER — MECLIZINE HCL 12.5 MG/1
12.5 TABLET ORAL 3 TIMES DAILY PRN
Qty: 30 TABLET | Refills: 0 | Status: SHIPPED | OUTPATIENT
Start: 2024-03-07

## 2024-03-07 NOTE — TELEPHONE ENCOUNTER
Please notify the patient that the CT scan was negative for vascular abnormality.  Specifically no dissection or bleeding.    Please keep a close eye on the dizziness, headaches.  I will prescribe meclizine 12.5 mg up to 3 times a day as needed.  Should only use if he is having the symptoms.  Notify us if still not getting better.

## 2024-03-12 ENCOUNTER — PATIENT MESSAGE (OUTPATIENT)
Dept: INTERNAL MEDICINE CLINIC | Facility: CLINIC | Age: 69
End: 2024-03-12

## 2024-03-12 NOTE — TELEPHONE ENCOUNTER
From: Bg Johnson  To: Fredy Jacobson  Sent: 3/12/2024 11:38 AM CDT  Subject: Neck    Did the CT show any signs of neck bone misalignment should I have an x ray of neck. I am going to physical therapy helps but slow process

## 2024-03-13 RX ORDER — CYCLOBENZAPRINE HCL 5 MG
5 TABLET ORAL 3 TIMES DAILY PRN
Qty: 30 TABLET | Refills: 0 | Status: SHIPPED | OUTPATIENT
Start: 2024-03-13

## 2024-04-02 ENCOUNTER — OFFICE VISIT (OUTPATIENT)
Dept: ENDOCRINOLOGY CLINIC | Facility: CLINIC | Age: 69
End: 2024-04-02
Payer: COMMERCIAL

## 2024-04-02 VITALS
SYSTOLIC BLOOD PRESSURE: 143 MMHG | WEIGHT: 206 LBS | BODY MASS INDEX: 30 KG/M2 | HEART RATE: 69 BPM | DIASTOLIC BLOOD PRESSURE: 82 MMHG

## 2024-04-02 DIAGNOSIS — E11.69 TYPE 2 DIABETES MELLITUS WITH OTHER SPECIFIED COMPLICATION, UNSPECIFIED WHETHER LONG TERM INSULIN USE (HCC): Primary | ICD-10-CM

## 2024-04-02 DIAGNOSIS — E78.5 DYSLIPIDEMIA: ICD-10-CM

## 2024-04-02 LAB
GLUCOSE BLOOD: 134
TEST STRIP LOT #: NORMAL NUMERIC

## 2024-04-02 PROCEDURE — 99244 OFF/OP CNSLTJ NEW/EST MOD 40: CPT | Performed by: INTERNAL MEDICINE

## 2024-04-02 PROCEDURE — 3079F DIAST BP 80-89 MM HG: CPT | Performed by: INTERNAL MEDICINE

## 2024-04-02 PROCEDURE — 95251 CONT GLUC MNTR ANALYSIS I&R: CPT | Performed by: INTERNAL MEDICINE

## 2024-04-02 PROCEDURE — 82947 ASSAY GLUCOSE BLOOD QUANT: CPT | Performed by: INTERNAL MEDICINE

## 2024-04-02 PROCEDURE — 3077F SYST BP >= 140 MM HG: CPT | Performed by: INTERNAL MEDICINE

## 2024-04-02 NOTE — H&P
New Patient Visit - Diabetes    CONSULT - Reason for Visit:  Diabetes management.    Requesting Physician: Dr. Jacobson    CHIEF COMPLAINT:    Chief Complaint   Patient presents with    Diabetes    Consult        HISTORY OF PRESENT ILLNESS:   Bg Johnson is a 68 year old male who is here to establish care for DM.     DM HISTORY  Diagnosed: > 20 years ago      HISTORY OF DIABETES COMPLICATIONS: :  History of Retinopathy: denies - last eye exam : 2023   History of Neuropathy: denies  History of Nephropathy: last Ur MA was high .    ASSOCIATED COMPLICATIONS:   HTN: yes  Hyperlipidemia: yes  Coronary Artery Disease:  denies  Cerebrovascular Disease: denies      HOME GLUCOSE READINGS:   [employer] download reviewed      CURRENT DIABETIC MEDICATIONS INCLUDE:  MTF 1000 mg BF and dinner  Farxiga 10 mg daily   Tresiba 20 units BID--.> States this was increased in Feb 2024    He has been on byetta in the past       MEALS:  Good compliance with meals  Tries to decrease carb content of meals    EXERCISE:  Works out a lot       CURRENT MEDICATIONS:    Current Outpatient Medications   Medication Sig Dispense Refill    cyclobenzaprine 5 MG Oral Tab Take 1 tablet (5 mg total) by mouth 3 (three) times daily as needed for Muscle spasms. 30 tablet 0    meclizine 12.5 MG Oral Tab Take 1 tablet (12.5 mg total) by mouth 3 (three) times daily as needed for Dizziness. 30 tablet 0    insulin degludec (TRESIBA FLEXTOUCH) 100 UNIT/ML Subcutaneous Solution Pen-injector Inject 20 Units into the skin in the morning and 20 Units before bedtime. 36 mL 1    metFORMIN HCl 1000 MG Oral Tab Take 1 tablet (1,000 mg total) by mouth 2 (two) times daily with meals. 180 tablet 3    dapagliflozin 10 MG Oral Tab Farxiga 10 mg tablet. TAKE 1 TABLET BY MOUTH ONCE DAILY 90 tablet 3    Continuous Blood Gluc Sensor (FREESTYLE AMENA 2 SENSOR) Does not apply Misc Apply 1 Application topically every 14 (fourteen) days. 12 each 3    lisinopril 10 MG Oral Tab Take 1 tablet  (10 mg total) by mouth daily. 90 tablet 3    Lovastatin 20 MG Oral Tab Take 1 tablet (20 mg total) by mouth nightly. 90 tablet 3    Cyanocobalamin (B-12 OR) Take by mouth.      aspirin 81 MG Oral Tab EC Take 1 tablet (81 mg total) by mouth daily.      ergocalciferol 1.25 MG (41686 UT) Oral Cap Take 1 capsule (50,000 Units total) by mouth once a week.         PAST MEDICAL HISTORY:   Past Medical History:   Diagnosis Date    Diabetes (HCC)     Diabetes 1.5, managed as type 2 (HCC)     Essential hypertension     Hyperlipidemia        PAST SURGICAL HISTORY:   Past Surgical History:   Procedure Laterality Date    COLONOSCOPY      in [place]    EGD      25 yrs ago at [facility]       ALLERGIES:  No Known Allergies    SOCIAL HISTORY:    Social History     Socioeconomic History    Marital status: Single   Tobacco Use    Smoking status: Never    Smokeless tobacco: Never   Vaping Use    Vaping Use: Never used   Substance and Sexual Activity    Alcohol use: Never    Drug use: Never       FAMILY HISTORY:   Family History   Problem Relation Age of Onset    Heart Disease Mother         late 80s    Diabetes Mother     Other (Other) Father         blood disorder: AMDS    Diabetes Maternal Grandmother        ASSESSMENTS:        REVIEW OF SYSTEMS:  Constitutional: Negative for: weight change, fever, fatigue, cold/heat intolerance  Eyes: Negative for:  Visual changes, proptosis, blurring, floaters, poor night vision, impaired color vision  ENT: Negative for:  dysphagia, neck swelling, dysphonia  Respiratory: Negative for:  dyspnea, cough  Cardiovascular: Negative for:  chest pain, palpitations, orthopnea  GI: Negative for:  abdominal pain, nausea, vomiting, diarrhea, constipation, bleeding  Neurology: Negative for: headache, numbness, weakness,   Genito-Urinary: Negative for: dysuria, frequency  Psychiatric: Negative for:  depression, anxiety  Hematology/Lymphatics: Negative for: bruising, lower extremity edema  Endocrine: Negative  for: polyuria, polydypsia  Skin: Negative for: rash, blister,      PHYSICAL EXAM:   Vitals:    04/02/24 1252   BP: 143/82   Pulse: 69   Weight: 206 lb (93.4 kg)     BMI: Body mass index is 30.42 kg/m².         General Appearance:  alert, well developed, in no acute distress  Head: Atraumatic  Eyes:  normal conjunctivae, sclera., normal sclera and normal pupils  Throat/Neck: normal sound to voice. Normal hearing, normal speech  Respiratory:  Speaking in full sentences, non-labored. no increased work of breathing, no audible wheezing    Neuro: motor grossly intact, moving all extremities without difficulty  Psychiatric:  oriented to time, self, and place    DIABETIC FOOT EXAM: April 2024:   normal monofilament sensation, normal pulses, no edema, no skin lesions      DATA:     Pertinent data reviewed  A1c is 9.2 % ( 4/2024)     ASSESSMENT AND PLAN:    1. Type 2 DM: with hyperglycemia     Plan:  Discussed the pathogenesis, natural course of diabetes. Patient understands the importance of glycemic control and the implications of uncontrolled diabetes including Diabetic ketoacidosis and various micro vascular and macrovascular complications.        a). Medications:    Continuous Glucose Monitoring Interpretation    Bg Johnson has undergone continuous glucose monitoring with the personal dexcom. He was evaluated with the dexcom from 3/20-4/2/2024. .  His blood glucose tracings demonstrated:   Very high 20 %   High 30 %   Target 50 %  Low 0 %   Very low 0 %      As a result of his testing the following plan was made:     Metformin 1000 mg with BF and dinner  Take with food  GI SE reviewed    START Ozempic 0.25 mg weekly x 2 weeks, followed by 0.5 mg weekly after that   Pen teaching provide  No personal or family history of MEN syndrome  Patient counselled regarding side effects including injection site reactions, nausea, vomiting, diarrhea, pancreatitis, gastroparesis and rare side effect angel Mitchell  syndrome.    Farxiga 10 mg daily   Discussed side effects including UTI and fungal infections, skin, genital infections.   Discussed the importance of hydration.      Tresiba 20 units BID--> 15 units BID    Check and call with sugars as discussed   To call if he has low BG under 80    b). Urine microalbumin /creatinine high on recent labs. Will work on BP and BG control Will repeat test  in 5-6 months  c). Discussed importance of annual eye exams  d). Foot exam: Daily feet exam explained  e). BG log maintainence explained in great detail, to get log and glucometer on next visit.  f). Life style changes: Diet: low carbohydrate diet discussed, Exercise: should target a weight loss of 7% and increase exercise to at least 150min a week.  g). Hypoglycemia recognition and management discussed    2. Patient’s BP is normal today  3. Dyslipidemia  A) Discussed lifestyle modifications including reductions in dietary total and saturated fat, weight loss, aerobic exercise, and eating a diet rich in fruits and vegetables.  B) Statin therapy  Discussed the potential side effects of statins including muscle and liver injury.    RTC in 3-4 months  Check and call with sugars as discussed    Orders Placed This Encounter   Procedures    POC HemoCue Glucose 201 (Finger stick glucose)           Bettye Mora MD

## 2024-04-08 ENCOUNTER — PATIENT MESSAGE (OUTPATIENT)
Dept: ENDOCRINOLOGY CLINIC | Facility: CLINIC | Age: 69
End: 2024-04-08

## 2024-04-09 NOTE — TELEPHONE ENCOUNTER
From: Bg Johnson  To: Bettye Mora  Sent: 4/8/2024 11:03 AM CDT  Subject: Drug    Did you send prescription to [facility] for Ozemptic ? Have not received notice to pick-up

## 2024-04-25 ENCOUNTER — TELEPHONE (OUTPATIENT)
Dept: INTERNAL MEDICINE CLINIC | Facility: CLINIC | Age: 69
End: 2024-04-25

## 2024-04-25 ENCOUNTER — PATIENT MESSAGE (OUTPATIENT)
Dept: INTERNAL MEDICINE CLINIC | Facility: CLINIC | Age: 69
End: 2024-04-25

## 2024-04-25 NOTE — TELEPHONE ENCOUNTER
Agree should be seen - unable to see until next week therefore should see associate or urgent care

## 2024-04-25 NOTE — TELEPHONE ENCOUNTER
Patient called back, states he slipped on a wet boat dock about 2 weeks ago, he landed on his buttocks and fell backwards hitting his back. Patient states that he is still having pain in the tailbone and right buttocks area. Pain not as bad but he is concerned that the pain is still there. No bruising per patient. Asking if he needs Xrays or needs to be seen.   To  to please advise

## 2024-04-25 NOTE — TELEPHONE ENCOUNTER
----- Message from Bg Johnson sent at 4/25/2024  9:27 AM CDT -----  Regarding: X-Ray  Contact: [phone]  I feel about two weeks ago and landed on my butt but believe I might of cracked or broken my tail bone.     It is very sore in that area and jut want to know

## 2024-04-30 ENCOUNTER — HOSPITAL ENCOUNTER (OUTPATIENT)
Age: 69
Discharge: HOME | End: 2024-04-30
Payer: COMMERCIAL

## 2024-04-30 ENCOUNTER — APPOINTMENT (OUTPATIENT)
Dept: GENERAL RADIOLOGY | Age: 69
End: 2024-04-30
Attending: PHYSICIAN ASSISTANT
Payer: COMMERCIAL

## 2024-04-30 VITALS
HEART RATE: 72 BPM | TEMPERATURE: 98 F | OXYGEN SATURATION: 98 % | RESPIRATION RATE: 16 BRPM | DIASTOLIC BLOOD PRESSURE: 79 MMHG | SYSTOLIC BLOOD PRESSURE: 148 MMHG
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
## 2024-04-30 DIAGNOSIS — M54.50 LUMBAR BACK PAIN: ICD-10-CM

## 2024-04-30 PROCEDURE — 99203 OFFICE O/P NEW LOW 30 MIN: CPT | Performed by: PHYSICIAN ASSISTANT

## 2024-04-30 PROCEDURE — 72100 X-RAY EXAM L-S SPINE 2/3 VWS: CPT | Performed by: PHYSICIAN ASSISTANT

## 2024-04-30 NOTE — ED INITIAL ASSESSMENT (HPI)
Pt reports slipping on a wet area on his boat dock and landing on his bottom two weeks ago. Pt c/o continued tailbone pain/right sided back pain. States he did hit his head but denies LOC, dizziness/nausea.

## 2024-04-30 NOTE — ED PROVIDER NOTES
Patient Seen in: [facility]      History     Chief Complaint   Patient presents with    Fall     Stated Complaint: Back Pain    Subjective:   HPI    68-year-old male presenting for evaluation of low back pain, tailbone pain.  States he slipped on his boat dock approximately 2 weeks ago landing on his buttock.  States he did bump his head but there was no loss of consciousness.  He has no complaints of headache, dizziness or nausea.  He is not on any blood thinners.  Patient denies numbness or tingling in the groin, urinary retention or incontinence.  Denies any fevers.    Objective:   Past Medical History:    Diabetes (HCC)    Diabetes 1.5, managed as type 2 (HCC)    Essential hypertension    Hyperlipidemia              Past Surgical History:   Procedure Laterality Date    [facility]      [facility]      [facility] yrs ago at [facility]                Social History     Socioeconomic History    Marital status: Single   Tobacco Use    Smoking status: Never    Smokeless tobacco: Never   Vaping Use    Vaping status: Never Used   Substance and Sexual Activity    Alcohol use: Never    Drug use: Never              Review of Systems    Positive for stated complaint: Back Pain  Other systems are as noted in HPI.  Constitutional and vital signs reviewed.      All other systems reviewed and negative except as noted above.    Physical Exam     ED Triage Vitals [04/30/24 1620]   /79   Pulse 72   Resp 16   Temp 97.6 °F (36.4 °C)   Temp src Temporal   SpO2 98 %   O2 Device None (Room air)       Current:/79   Pulse 72   Temp 97.6 °F (36.4 °C) (Temporal)   Resp 16   SpO2 98%         Physical Exam  Vitals and nursing note reviewed.   Constitutional:       General: He is not in acute distress.  HENT:      Head: Normocephalic and atraumatic.      Right Ear: External ear normal.      Left Ear: External ear normal.      Nose: Nose normal.      Mouth/Throat:      Mouth: Mucous membranes are moist.   Eyes:       Extraocular Movements: Extraocular movements intact.      Pupils: Pupils are equal, round, and reactive to light.   Cardiovascular:      Rate and Rhythm: Normal rate.   Pulmonary:      Effort: Pulmonary effort is normal.   Abdominal:      General: Abdomen is flat.   Musculoskeletal:         General: Normal range of motion.      Cervical back: Normal range of motion.        Back:       Comments: Area of tenderness to lumbar paraspinal area   Skin:     General: Skin is warm.   Neurological:      General: No focal deficit present.      Mental Status: He is alert and oriented to person, place, and time.   Psychiatric:         Mood and Affect: Mood normal.         Behavior: Behavior normal.               ED Course   Labs Reviewed - No data to display     58-year-old male presenting for evaluation of low back pain after fall 2 weeks ago.    Ddx-lumbar strain, contusion, lumbar radiculopathy, sacral contusion versus fracture  Patient has no S/SX of cauda equina at this time and is ambulatory with steady gait     X-ray with no evidence of fracture or abnormality.  Discussed continued supportive care, PCP follow-up if the pain does not improve         MDM                                         Medical Decision Making      Disposition and Plan     Clinical Impression:  1. Fall, initial encounter    2. Lumbar back pain         Disposition:  Discharge  4/30/2024  5:29 pm    Follow-up:  Fredy Jacobson MD  [address] [place] [id number]  [phone]                Medications Prescribed:  Current Discharge Medication List

## 2024-05-02 ENCOUNTER — PATIENT MESSAGE (OUTPATIENT)
Dept: ENDOCRINOLOGY CLINIC | Facility: CLINIC | Age: 69
End: 2024-05-02

## 2024-05-02 ENCOUNTER — PATIENT MESSAGE (OUTPATIENT)
Dept: INTERNAL MEDICINE CLINIC | Facility: CLINIC | Age: 69
End: 2024-05-02

## 2024-05-02 NOTE — TELEPHONE ENCOUNTER
From: Bg Johnson  To: Fredy Jacobson  Sent: 5/2/2024 10:04 AM CDT  Subject: X Ray    Thank you for heads up on x-ray went the [facility] and no broken bones just very sore

## 2024-05-02 NOTE — TELEPHONE ENCOUNTER
From: Bg Johnson  To: Bettye Mora  Sent: 5/2/2024 10:28 AM CDT  Subject: Ozemtic     I started at .25 ml every week and having some not very good side affects. I feel full all the time and really have no desire to eat anything. The first injection was ok but on the second one. It took affect. Can only eat extremely small amount without feeling full or bloated.     I have lack of energy. I was due for another injection on Monday and did not do it but still having the affects of being full all the time. It hurts to eat.    Is there a lower lever to dose?    Sugar levels are low and cut back from 20 to 15 units of insulin According to meter within target 75% of the time now. It seems to work there     I feel backwards about two weeks ago and hurt my back nothing broken from the x rays.

## 2024-05-07 ENCOUNTER — TELEPHONE (OUTPATIENT)
Dept: INTERNAL MEDICINE CLINIC | Facility: CLINIC | Age: 69
End: 2024-05-07

## 2024-05-07 NOTE — TELEPHONE ENCOUNTER
Per chart review, pt was seen at urgent care 4/30. MD sent pt a follow up message--refer to 5/2 encounter.

## 2024-10-05 ENCOUNTER — PATIENT MESSAGE (OUTPATIENT)
Dept: INTERNAL MEDICINE CLINIC | Facility: CLINIC | Age: 69
End: 2024-10-05

## 2024-10-07 RX ORDER — INSULIN DEGLUDEC 100 U/ML
25 INJECTION, SOLUTION SUBCUTANEOUS DAILY
COMMUNITY
Start: 2024-07-29 | End: 2024-10-07

## 2024-10-07 NOTE — TELEPHONE ENCOUNTER
Per 4/2/24 ov note Dr. Mora: Luna 20 units BID--> 15 units BID     MyChart to pt to verify dose. Pt overdue for physical.

## 2024-10-07 NOTE — TELEPHONE ENCOUNTER
From: Bg Johnson  To: Fredy Jacobson  Sent: 10/5/2024 4:39 PM CDT  Subject: Insilin    I am running out of insulin since my prescription was for 25 units and as we discussed I am taking 20 units in the morning and 20 units in the evening    Please send [facility] pharmacy a new prescription for Tresiba     I only have one pen left and will last about a week and insurance will not refill until 10/15

## 2024-10-08 RX ORDER — INSULIN DEGLUDEC 100 U/ML
20 INJECTION, SOLUTION SUBCUTANEOUS 2 TIMES DAILY
Qty: 45 ML | Refills: 0 | Status: SHIPPED | OUTPATIENT
Start: 2024-10-08

## 2024-10-08 NOTE — TELEPHONE ENCOUNTER
Patient called to check status no refill    He has a couple days left of insulin    Check his blood sugars but does not a log with the readings

## 2025-01-14 ENCOUNTER — APPOINTMENT (OUTPATIENT)
Dept: GENERAL RADIOLOGY | Age: 70
End: 2025-01-14
Attending: NURSE PRACTITIONER
Payer: COMMERCIAL

## 2025-01-14 ENCOUNTER — HOSPITAL ENCOUNTER (OUTPATIENT)
Age: 70
Discharge: HOME | End: 2025-01-14
Payer: COMMERCIAL

## 2025-01-14 VITALS
OXYGEN SATURATION: 100 % | SYSTOLIC BLOOD PRESSURE: 156 MMHG | DIASTOLIC BLOOD PRESSURE: 82 MMHG | TEMPERATURE: 99 F | HEART RATE: 78 BPM | RESPIRATION RATE: 19 BRPM

## 2025-01-14 DIAGNOSIS — S69.92XA INJURY OF LEFT WRIST, INITIAL ENCOUNTER: ICD-10-CM

## 2025-01-14 DIAGNOSIS — S52.502A CLOSED FRACTURE OF DISTAL END OF LEFT RADIUS, UNSPECIFIED FRACTURE MORPHOLOGY, INITIAL ENCOUNTER: Primary | ICD-10-CM

## 2025-01-14 PROCEDURE — 73110 X-RAY EXAM OF WRIST: CPT | Performed by: NURSE PRACTITIONER

## 2025-01-14 RX ORDER — INSULIN DEGLUDEC 100 U/ML
20 INJECTION, SOLUTION SUBCUTANEOUS 2 TIMES DAILY
Qty: 45 ML | Refills: 0 | Status: SHIPPED | OUTPATIENT
Start: 2025-01-14

## 2025-01-14 RX ORDER — ACETAMINOPHEN 500 MG
1000 TABLET ORAL ONCE
Status: COMPLETED | OUTPATIENT
Start: 2025-01-14 | End: 2025-01-14

## 2025-01-14 NOTE — ED INITIAL ASSESSMENT (HPI)
Patient c/o left wrist and fore arm pain and swelling due to a fall this morning. Patient denies head injury.

## 2025-01-14 NOTE — DISCHARGE INSTRUCTIONS
As discussed, it does look like you have a fracture of your distal radius.  However, no displacement.  You were placed in a temporary cast and given a sling.    The sling only needs to be worn while you are up, awake, alert, active.  Remove while sleeping and resting.  Elevate extremity while sleeping and resting.  Tylenol as needed for pain.    I have given you the name of 2 different orthopedic specialist that you can follow-up with within the next 72 hours.    If you have any worsening pain, discoloration of your fingers, swelling of your fingers, temperature change of your fingers, please remove Ace bandage wrap and cast and go to emergency room.

## 2025-01-14 NOTE — TELEPHONE ENCOUNTER
Refill request is for a maintenance medication and has met the criteria specified in the Ambulatory Medication Refill Standing Order for eligibility, visits, laboratory, alerts and was sent to the requested pharmacy.    Requested Prescriptions     Signed Prescriptions Disp Refills    insulin degludec (TRESIBA FLEXTOUCH) 100 UNIT/ML Subcutaneous Solution Pen-injector 45 mL 0     Sig: Inject 20 Units into the skin in the morning and 20 Units before bedtime.     Authorizing Provider: YUNIER CONNELL     Ordering User: ANURAG ROMERO pt will be due for physical

## 2025-01-15 NOTE — ED PROVIDER NOTES
Patient Seen in: [facility]      History     Chief Complaint   Patient presents with    Arm or Hand Injury     Stated Complaint: Fall, left arm pain    Subjective: This is a 69-year-old male, past medical history of hypertension, diabetes type 2, hyperlipidemia, presents to immediate care clinic for evaluation of trip and fall that happened this morning.  Patient states he fell outside on the ice.  He landed on his left wrist.  However, denies FOOSH like mechanism.  Left-hand-dominant.  Patient states initially his mid forearm was painful but he thought he may have hit forearm on shovel.  It was until he was out to lunch with his sister that he noticed his wrist was swollen and bruised.  Patient rates pain 5 out of 10.  No obvious asymmetry or deformity.  He did not hit his head during the fall.  No LOC.  No previous injury to left upper extremity.  Well-appearing.  Good radial pulse.  Good cap refill.  AOx4.  The history is provided by the patient.             Objective:     Past Medical History:    Diabetes (HCC)    Diabetes 1.5, managed as type 2 (HCC)    Essential hypertension    Hyperlipidemia              Past Surgical History:   Procedure Laterality Date    Colonoscopy      in [place]    Egd      25 yrs ago at [facility]                No pertinent social history.            Review of Systems   Constitutional: Negative.    Musculoskeletal:  Positive for arthralgias and joint swelling.   Skin:  Positive for color change.   Neurological: Negative.        Positive for stated complaint: Fall, left arm pain  Other systems are as noted in HPI.  Constitutional and vital signs reviewed.      All other systems reviewed and negative except as noted above.    Physical Exam     ED Triage Vitals   BP 01/14/25 1638 156/82   Pulse 01/14/25 1637 89   Resp 01/14/25 1636 19   Temp 01/14/25 1636 98.6 °F (37 °C)   Temp src 01/14/25 1636 Oral   SpO2 01/14/25 1636 100 %   O2 Device 01/14/25 1636 None (Room air)        Current Vitals:   Vital Signs  BP: 156/82  Pulse: 78  Resp: 19  Temp: 98.6 °F (37 °C)  Temp src: Oral    Oxygen Therapy  SpO2: 100 %  O2 Device: None (Room air)        Physical Exam  Constitutional:       General: He is not in acute distress.     Appearance: Normal appearance. He is not ill-appearing.   Cardiovascular:      Rate and Rhythm: Normal rate.      Pulses: Normal pulses.   Pulmonary:      Effort: Pulmonary effort is normal.   Musculoskeletal:         General: Swelling and tenderness present.      Left wrist: Swelling, tenderness and bony tenderness present. No snuff box tenderness. Decreased range of motion.        Arms:    Skin:     General: Skin is warm.      Capillary Refill: Capillary refill takes less than 2 seconds.      Findings: Bruising present.   Neurological:      General: No focal deficit present.      Mental Status: He is alert and oriented to person, place, and time.             ED Course   Labs Reviewed - No data to display     XR WRIST COMPLETE (MIN 3 VIEWS), LEFT (CPT=73110)    Result Date: 1/14/2025  CONCLUSION:   Acute, nondisplaced fracture involving the distal radius.  No dislocation.     Dictated by (CST): Elgin Blanco MD on 1/14/2025 at 5:26 PM     Finalized by (CST): Elgin Blanco MD on 1/14/2025 at 5:30 PM                    MDM      Differentials Consider include: Distal radius fracture, ulnar styloid fracture, callus fracture, fracture with displacement, wrist sprain.    Patient does have soft tissue swelling and ecchymosis to distal radius.  He is tender at distal radius.  No volar deformity.  No dinner fork deformity.  Nontender ulnar styloid.  Nontender to midshaft forearm.  X-ray of forearm withheld.    Patient has no snuffbox tenderness on examination.    Positive limited range of motion.  Pain with flexion.  Minimal pain with supination and pronation and extension.  Strong radial pulse.  Good cap refill.    X-ray obtained.  X-ray independently reviewed by  provider.  There is a positive distal radius fracture without displacement.  No deformity.  X-ray independently reviewed by provider.    Patient was placed in sugar-tong postmold and sling provided.  Postmold was checked by provider after application.  No neurovascular compromise.  Good cap refill.  Sensation to distal fingers intact.    Patient is aware that the garment needs to be worn while up, awake, alert, active.  He is aware to remove while sleeping and resting.  Extremity while sleeping and resting.    Encourage patient to take Tylenol as needed for pain alleviation.  He declined opioids.    Did give the name of 2 orthopedic specialist that he can follow-up with.    Patient is aware of signs and symptoms that warrant immediate ER evaluation.  He verbalized understanding agrees with plan of care.        Medical Decision Making  Amount and/or Complexity of Data Reviewed  Radiology: ordered and independent interpretation performed. Decision-making details documented in ED Course.        Disposition and Plan     Clinical Impression:  1. Closed fracture of distal end of left radius, unspecified fracture morphology, initial encounter    2. Injury of left wrist, initial encounter         Disposition:  Discharge  1/14/2025  5:57 pm    Follow-up:  Poppy Wilcox PA  [address] [place] [address] [id number] [phone]    Schedule an appointment as soon as possible for a visit   This is an orthopedic physician i would like you to follow up with    Blas Diaz MD  [address] [facility] [address] [phone]    Schedule an appointment as soon as possible for a visit   This is an orthopedic specialist I would like you to follow up with          Medications Prescribed:  Discharge Medication List as of 1/14/2025  5:57 PM              Supplementary Documentation:

## 2025-02-02 ENCOUNTER — PATIENT MESSAGE (OUTPATIENT)
Dept: INTERNAL MEDICINE CLINIC | Facility: CLINIC | Age: 70
End: 2025-02-02

## 2025-02-04 NOTE — TELEPHONE ENCOUNTER
To Front dek - can use Same Day Sick slot on 2/10 or use 2/11 1:30 pm hold slot. Let me know what he se

## 2025-02-05 ENCOUNTER — OFFICE VISIT (OUTPATIENT)
Dept: NEUROLOGY | Facility: CLINIC | Age: 70
End: 2025-02-05
Payer: COMMERCIAL

## 2025-02-05 VITALS — BODY MASS INDEX: 29.62 KG/M2 | HEIGHT: 69 IN | WEIGHT: 200 LBS

## 2025-02-05 DIAGNOSIS — R42 VERTIGO: Primary | ICD-10-CM

## 2025-02-05 DIAGNOSIS — G45.9 TIA (TRANSIENT ISCHEMIC ATTACK): ICD-10-CM

## 2025-02-05 DIAGNOSIS — M54.2 NECK PAIN: ICD-10-CM

## 2025-02-05 PROCEDURE — 3008F BODY MASS INDEX DOCD: CPT | Performed by: OTHER

## 2025-02-05 PROCEDURE — 99204 OFFICE O/P NEW MOD 45 MIN: CPT | Performed by: OTHER

## 2025-02-05 NOTE — PROGRESS NOTES
[address], [address]  [facility] [address]  [phone]            Neurology Initial Visit     Referred By: Dr. Reese ref. provider found    Chief Complaint:   Chief Complaint   Patient presents with    Neurologic Problem     Patient presents here today to establish care, patient was self referred to evaluate and treat cervical vertigo, patient c/o on 1/30/2025 went to ED at [facility], patient states he lost balance, nausea, and felt to room spinning.          HPI:     Bg Johnson is a 69 year old male, who presents for vertigo.   The patient presents with recurrent episodes of vertigo. The most recent episode occurred last Thursday, when the patient experienced sudden onset of severe vertigo upon exiting his vehicle, nearly causing him to collapse. The episode was accompanied by nausea and vomiting, persisting for 3-4 hours. The patient called emergency services and was transported to [facility].    The patient reports a similar episode approximately one year ago, which was associated with extreme neck pain. The neck pain persisted for about 4 months, eventually resolving after rehabilitation. A third episode occurred prior to these, also resolving within 30 minutes but followed by prolonged neck pain.    The patient denies any specific triggers for these episodes and reports no pain associated with the most recent event. He notes feeling delirious during the acute phase. The patient also mentions having reduced hearing in his left ear compared to the right, for which he uses hearing aids. An MRI for acoustic neuroma was performed 20 years ago.    Regarding ENT, the left ear is weaker in hearing than the right ear.    Past Medical History:    Diabetes (HCC)    Diabetes 1.5, managed as type 2 (HCC)    Essential hypertension    Hyperlipidemia       Past Surgical History:   Procedure Laterality Date    Colonoscopy      in [place]    Egd      25 yrs  ago at [facility]       Social history:  History   Smoking Status    Never   Smokeless Tobacco    Never     History   Alcohol Use Never     History   Drug Use Unknown       Family History   Problem Relation Age of Onset    Heart Disease Mother         late 80s    Diabetes Mother     Other (Other) Father         blood disorder: AMDS    Diabetes Maternal Grandmother          Current Outpatient Medications:     insulin degludec (TRESIBA FLEXTOUCH) 100 UNIT/ML Subcutaneous Solution Pen-injector, Inject 20 Units into the skin in the morning and 20 Units before bedtime., Disp: 45 mL, Rfl: 0    meclizine 12.5 MG Oral Tab, Take 1 tablet (12.5 mg total) by mouth 3 (three) times daily as needed for Dizziness., Disp: 30 tablet, Rfl: 0    metFORMIN HCl 1000 MG Oral Tab, Take 1 tablet (1,000 mg total) by mouth 2 (two) times daily with meals., Disp: 180 tablet, Rfl: 3    dapagliflozin 10 MG Oral Tab, Farxiga 10 mg tablet. TAKE 1 TABLET BY MOUTH ONCE DAILY, Disp: 90 tablet, Rfl: 3    Continuous Blood Gluc Sensor (FREESTYLE NORBERTO 2 SENSOR) Does not apply Misc, Apply 1 Application topically every 14 (fourteen) days., Disp: 12 each, Rfl: 3    lisinopril 10 MG Oral Tab, Take 1 tablet (10 mg total) by mouth daily., Disp: 90 tablet, Rfl: 3    Lovastatin 20 MG Oral Tab, Take 1 tablet (20 mg total) by mouth nightly., Disp: 90 tablet, Rfl: 3    Cyanocobalamin (B-12 OR), Take by mouth., Disp: , Rfl:     aspirin 81 MG Oral Tab EC, Take 1 tablet (81 mg total) by mouth daily., Disp: , Rfl:     ergocalciferol 1.25 MG (59092 UT) Oral Cap, Take 1 capsule (50,000 Units total) by mouth once a week., Disp: , Rfl:     semaglutide 2 MG/3ML Subcutaneous Solution Pen-injector, Inject 0.25 mg into the skin once a week for 14 days, THEN 0.5 mg once a week., Disp: 9 mL, Rfl: 0    cyclobenzaprine 5 MG Oral Tab, Take 1 tablet (5 mg total) by mouth 3 (three) times daily as needed for Muscle spasms. (Patient not taking: Reported on 2/5/2025), Disp: 30 tablet,  Rfl: 0    Allergies[1]    ROS:   As in HPI, the rest of the 14 system review was done and was negative      Physical Exam:  Vitals:    02/05/25 0959   Weight: 200 lb (90.7 kg)   Height: 69\"       General: No apparent distress, well nourished, well groomed.  Head- Normocephalic, atraumatic  Eyes- No redness or swelling  ENT- Hearing intake, normal glutition  Neck- No masses or adenopathy  Cv: pulses were palpable and normal, no cyanosis or edema     Neurological:     Mental Status- Alert and oriented x3.  Normal attention span and concentration  Thought process intact  Memory intact- recent and remote  Mood intact  Fund of knowledge appropriate for education and age    Language intact including: comprehension, naming, repetition, vocabulary    Cranial Nerves:  II.- Visual fields full to confrontation    III, IV, VI- EOM intact, OLAF  V. Facial sensation intact  VII. Face symmetric, no facial weakness  VIII. Hearing intact to whisper.  IX. Pallet elevates symmetrically.  XI. Shoulder shrug is intact  XII. Tongue is midline    Motor Exam:  Muscle tone normal  No atrophy or fasciculations  Strength- upper extremities 5/5 proximally and distally                  - lower  extremities 5/5 proximally and distally    Sensory Exam:  Light touch sensation- intact in all 4 extremities    Coordination:  Finger to nose intact  Rapid alternating movements intact    Gait:  Normal posture  Normal physiologic      Labs:    Lab Results   Component Value Date    TSH 1.907 02/12/2024     Lab Results   Component Value Date    HDL 51 02/12/2024    LDL 86 02/12/2024    TRIG 92 02/12/2024     Lab Results   Component Value Date    HGB 14.4 02/12/2024    HCT 45.0 02/12/2024    MCV 93.8 02/12/2024    WBC 5.9 02/12/2024    .0 02/12/2024      Lab Results   Component Value Date    BUN 20 02/12/2024    CA 9.7 02/12/2024    ALT 9 (L) 02/12/2024    AST 14 02/12/2024    ALB 4.3 02/12/2024     02/12/2024    K 4.3 02/12/2024      02/12/2024    CO2 29.0 02/12/2024      I have reviewed labs.      Assessment   1. Vertigo  Recurrent vertigo:  Patient presents with recurrent episodes of vertigo, with the most recent severe episode occurring last Thursday. The episode was characterized by sudden onset of severe dizziness, near-collapse, nausea, and vomiting, lasting approximately 3-4 hours. This is the third occurrence, with previous episodes about a year ago.  - MRI of brain to rule out stroke and check for acoustic neuroma  - CT angiogram of neck and brain to evaluate for vertebral artery dissection and other vascular abnormalities  - Referral to ENT (Dr. Babs Marie) for evaluation of possible inner ear disorders  - Cardiac monitoring to rule out cardiac causes of potential embolic events  - Continue aspirin for stroke prevention  - Follow-up appointment in one month    Hearing loss:  Patient reports left ear hearing weaker than right, currently using hearing aids. History of MRI for acoustic neuroma 20 years ago.  - MRI of brain (as mentioned above) to re-evaluate for acoustic neuroma  - ENT evaluation (as mentioned above) for further assessment of hearing loss    Neck pain:  Patient reports history of severe neck pain associated with first vertigo episode, lasting 4 months. Physical therapy was helpful in managing symptoms.  - Continue physical therapy for neck pain management  - Consider referral to physical therapist in [place] if needed  - CARD ECHO 2D W DOPPLER/BUBBLES (CPT=93306); Future  - CARD ZIO EXTENDED MONITOR 8-14 DAYS (CPT=93246/84500); Future  - CTA BRAIN + CTA CAROTIDS (CPT=70496/96235); Future  - Lipid Panel; Future  - MRI BRAIN (CPT=70551); Future    2. TIA (transient ischemic attack)  Continue with aspirin and treatment of underlying risk factors.    - CARD ECHO 2D W DOPPLER/BUBBLES (CPT=93306); Future  - CARD ZIO EXTENDED MONITOR 8-14 DAYS (CPT=93246/69457); Future  - CTA BRAIN + CTA CAROTIDS (CPT=70496/70920);  Future  - Lipid Panel; Future  - MRI BRAIN (CPT=70551); Future    3. Neck pain    Patient is already working with physical therapy.           Education and counseling provided to patient. Instructed patient to call my office or seek medical attention immediately if symptoms worsen.  Patient verbalized understanding of information given. All questions were answered. All side effects of drugs were discussed.       Return to clinic in: Return in about 4 weeks (around 3/5/2025).    Austyn Chavez MD           [1] No Known Allergies

## 2025-02-10 ENCOUNTER — OFFICE VISIT (OUTPATIENT)
Dept: OTOLARYNGOLOGY | Facility: CLINIC | Age: 70
End: 2025-02-10
Payer: COMMERCIAL

## 2025-02-10 ENCOUNTER — OFFICE VISIT (OUTPATIENT)
Dept: ENDOCRINOLOGY CLINIC | Facility: CLINIC | Age: 70
End: 2025-02-10
Payer: COMMERCIAL

## 2025-02-10 VITALS — WEIGHT: 200 LBS | BODY MASS INDEX: 29.62 KG/M2 | HEIGHT: 69 IN

## 2025-02-10 VITALS
BODY MASS INDEX: 30.21 KG/M2 | HEART RATE: 76 BPM | SYSTOLIC BLOOD PRESSURE: 121 MMHG | DIASTOLIC BLOOD PRESSURE: 66 MMHG | WEIGHT: 204 LBS | HEIGHT: 69 IN

## 2025-02-10 DIAGNOSIS — R42 VERTIGO: Primary | ICD-10-CM

## 2025-02-10 DIAGNOSIS — Z13.820 OSTEOPOROSIS SCREENING: ICD-10-CM

## 2025-02-10 DIAGNOSIS — R42 LIGHTHEADEDNESS: ICD-10-CM

## 2025-02-10 DIAGNOSIS — E11.21 DIABETIC NEPHROPATHY ASSOCIATED WITH TYPE 2 DIABETES MELLITUS (HCC): ICD-10-CM

## 2025-02-10 DIAGNOSIS — E11.69 TYPE 2 DIABETES MELLITUS WITH OTHER SPECIFIED COMPLICATION, UNSPECIFIED WHETHER LONG TERM INSULIN USE (HCC): Primary | ICD-10-CM

## 2025-02-10 DIAGNOSIS — E78.5 DYSLIPIDEMIA: ICD-10-CM

## 2025-02-10 DIAGNOSIS — E55.9 VITAMIN D DEFICIENCY: ICD-10-CM

## 2025-02-10 DIAGNOSIS — T14.8XXA FRACTURE: ICD-10-CM

## 2025-02-10 DIAGNOSIS — H91.8X3 ASYMMETRICAL HEARING LOSS: ICD-10-CM

## 2025-02-10 LAB
GLUCOSE BLOOD: 108
HEMOGLOBIN A1C: 7.8 % (ref 4.3–5.6)
TEST STRIP LOT #: NORMAL NUMERIC

## 2025-02-10 PROCEDURE — 3008F BODY MASS INDEX DOCD: CPT | Performed by: SPECIALIST

## 2025-02-10 PROCEDURE — 99203 OFFICE O/P NEW LOW 30 MIN: CPT | Performed by: SPECIALIST

## 2025-02-10 RX ORDER — DAPAGLIFLOZIN 10 MG/1
TABLET, FILM COATED ORAL
Qty: 90 TABLET | Refills: 1 | Status: SHIPPED | OUTPATIENT
Start: 2025-02-10

## 2025-02-10 RX ORDER — GLIPIZIDE 5 MG/1
2.5 TABLET ORAL
Qty: 45 TABLET | Refills: 0 | Status: SHIPPED | OUTPATIENT
Start: 2025-02-10

## 2025-02-10 RX ORDER — INSULIN DEGLUDEC 100 U/ML
18 INJECTION, SOLUTION SUBCUTANEOUS 2 TIMES DAILY
Qty: 32.4 ML | Refills: 0 | Status: SHIPPED | OUTPATIENT
Start: 2025-02-10 | End: 2025-05-11

## 2025-02-10 NOTE — PROGRESS NOTES
FU VISIT     CHIEF COMPLAINT:    Chief Complaint   Patient presents with    Diabetes     Follow up and glucose check     Refill Request     All medications         HISTORY OF PRESENT ILLNESS:   Bg Johnson is a 69 year old male who is here to FU for DM.     DM HISTORY  Diagnosed: > 20 years ago      HISTORY OF DIABETES COMPLICATIONS: :  History of Retinopathy: denies, has cataracts - last eye exam :  June 2024  History of Neuropathy: denies  History of Nephropathy: last Ur MA was high .    ASSOCIATED COMPLICATIONS:   HTN: yes  Hyperlipidemia: yes  Coronary Artery Disease:  denies  Cerebrovascular Disease: denies      HOME GLUCOSE READINGS:   Amena download reviewed      CURRENT DIABETIC MEDICATIONS INCLUDE:  MTF 1000 mg BF and dinner  Farxiga 10 mg daily   Tresiba 20 units BID    He has been on byetta in the past   Was prescribed ozempic in the past --> stopped since he did not feel good on it      MEALS:  Good compliance with meals  Tries to decrease carb content of meals    EXERCISE:  Works out a lot , but limited now due to recent wrist fracture      CURRENT MEDICATIONS:    Current Outpatient Medications   Medication Sig Dispense Refill    insulin degludec (TRESIBA FLEXTOUCH) 100 UNIT/ML Subcutaneous Solution Pen-injector Inject 18 Units into the skin in the morning and 18 Units before bedtime. 32.4 mL 0    dapagliflozin 10 MG Oral Tab Farxiga 10 mg tablet. TAKE 1 TABLET BY MOUTH ONCE DAILY 90 tablet 1    metFORMIN HCl 1000 MG Oral Tab Take 1 tablet (1,000 mg total) by mouth 2 (two) times daily with meals. 180 tablet 1    glipiZIDE 5 MG Oral Tab Take 0.5 tablets (2.5 mg total) by mouth every morning before breakfast. 45 tablet 0    cyclobenzaprine 5 MG Oral Tab Take 1 tablet (5 mg total) by mouth 3 (three) times daily as needed for Muscle spasms. 30 tablet 0    meclizine 12.5 MG Oral Tab Take 1 tablet (12.5 mg total) by mouth 3 (three) times daily as needed for Dizziness. 30 tablet 0    Continuous Blood Gluc  Sensor (FREESTYLE NORBERTO 2 SENSOR) Does not apply Misc Apply 1 Application topically every 14 (fourteen) days. 12 each 3    lisinopril 10 MG Oral Tab Take 1 tablet (10 mg total) by mouth daily. 90 tablet 3    Lovastatin 20 MG Oral Tab Take 1 tablet (20 mg total) by mouth nightly. 90 tablet 3    Cyanocobalamin (B-12 OR) Take by mouth.      aspirin 81 MG Oral Tab EC Take 1 tablet (81 mg total) by mouth daily.      ergocalciferol 1.25 MG (50602 UT) Oral Cap Take 1 capsule (50,000 Units total) by mouth once a week.         PAST MEDICAL HISTORY:   Past Medical History:    Diabetes (HCC)    Diabetes 1.5, managed as type 2 (HCC)    Essential hypertension    Hyperlipidemia       PAST SURGICAL HISTORY:   Past Surgical History:   Procedure Laterality Date    Colonoscopy      in [place]    Egd      25 yrs ago at [facility]       ALLERGIES:  No Known Allergies    SOCIAL HISTORY:    Social History     Socioeconomic History    Marital status: Single   Tobacco Use    Smoking status: Never    Smokeless tobacco: Never   Vaping Use    Vaping status: Never Used   Substance and Sexual Activity    Alcohol use: Never    Drug use: Never       FAMILY HISTORY:   Family History   Problem Relation Age of Onset    Heart Disease Mother         late 80s    Diabetes Mother     Other (Other) Father         blood disorder: AMDS    Diabetes Maternal Grandmother        ASSESSMENTS:        REVIEW OF SYSTEMS:  Constitutional: Negative for: weight change, fever, fatigue, cold/heat intolerance  Eyes: Negative for:  Visual changes, proptosis, blurring, floaters, poor night vision, impaired color vision  ENT: Negative for:  dysphagia, neck swelling, dysphonia  Respiratory: Negative for:  dyspnea, cough  Cardiovascular: Negative for:  chest pain, palpitations, orthopnea  GI: Negative for:  abdominal pain, nausea, vomiting, diarrhea, constipation, bleeding  Neurology: Negative for: headache, numbness, weakness,   Genito-Urinary: Negative for: dysuria,  frequency  Psychiatric: Negative for:  depression, anxiety  Hematology/Lymphatics: Negative for: bruising, lower extremity edema  Endocrine: Negative for: polyuria, polydypsia  Skin: Negative for: rash, blister,      PHYSICAL EXAM:   Vitals:    02/10/25 1134   BP: 121/66   Pulse: 76   Weight: 204 lb (92.5 kg)   Height: 5' 9\" (1.753 m)     BMI: Body mass index is 30.13 kg/m².         General Appearance:  alert, well developed, in no acute distress  Head: Atraumatic  Eyes:  normal conjunctivae, sclera., normal sclera and normal pupils  Throat/Neck: normal sound to voice. Normal hearing, normal speech  Respiratory:  Speaking in full sentences, non-labored. no increased work of breathing, no audible wheezing    Neuro: motor grossly intact, moving all extremities without difficulty  Psychiatric:  oriented to time, self, and place    DIABETIC FOOT EXAM: April 2024:   normal monofilament sensation, normal pulses, no edema, no skin lesions      DATA:     Pertinent data reviewed  A1c is 7.8 % ( 2/2025)     ASSESSMENT AND PLAN:    1. Type 2 DM: with hyperglycemia     Plan:  Discussed the pathogenesis, natural course of diabetes. Patient understands the importance of glycemic control and the implications of uncontrolled diabetes including Diabetic ketoacidosis and various micro vascular and macrovascular complications.        a). Medications:    Continuous Glucose Monitoring Interpretation    Bg Johnson has undergone continuous glucose monitoring with the personal dexcom. He was evaluated with the dexcom from1/28/2025 - 02/10/2025 (14 days)  Generated: 02/10/2025  Time CGM Active: 83%        Glucose Statistics and Targets  Average Glucose: 159 mg/dL  Glucose Management Indicator (GMI): 7.1%  Glucose Variability (%CV): 29.7%  Target Range: 70 - 180 mg/dL        Time in Ranges  Very High: >250 mg/dL --- 4%  High: 181 - 250 mg/dL --- 25%  Target Range: 70 - 180 mg/dL --- 71%  Low: 54 - 69 mg/dL --- 0%  Very Low: <54  mg/dL --- 0%   As a result of his testing the following plan was made:     A1c is better than before but still above goal   I discussed GLP agonist again, however, he does not want to try this class of medications  Noted PP hyperglycemia, discussed adding a low dose of glipizide with BF only  and cutting back on long acting insulin some     START glipizide 2.5 mg with BF only   Take with food  Reviewed risk of hypoglycemia    Metformin 1000 mg with BF and dinner  Take with food  GI SE reviewed      Farxiga 10 mg daily   Discussed side effects including UTI and fungal infections, skin, genital infections.   Discussed the importance of hydration.      Tresiba 20 units BID--> 18 units BID     Check and call with sugars as discussed   To call if he has low BG under 80    b). Urine microalbumin /creatinine high on recent labs. Will work on BP and BG control Will repeat level  c). Discussed importance of annual eye exams  d). Foot exam: Daily feet exam explained  e). BG log maintainence explained in great detail, to get log and glucometer on next visit.  f). Life style changes: Diet: low carbohydrate diet discussed, Exercise: should target a weight loss of 7% and increase exercise to at least 150min a week.  g). Hypoglycemia recognition and management discussed    2. Patient’s BP is normal today  3. Dyslipidemia  A) Discussed lifestyle modifications including reductions in dietary total and saturated fat, weight loss, aerobic exercise, and eating a diet rich in fruits and vegetables.  B) Statin therapy  Discussed the potential side effects of statins including muscle and liver injury.  4. Wrist fracture  Fell from standing height . Slipped on ice  DXA ordered   Vitamin D ordered   5. Reports two episodes of vertigo, passed out last time  Is undergoing GONZALEZ with specialists  Will check a 7-8 am cortisol level     RTC in 3-4 months  Check and call with sugars as discussed    Orders Placed This Encounter   Procedures    POC  HemoCue Glucose 201 (Finger stick glucose)    POC Glycohemoglobin [30348]    Microalb/Creat Ratio, Random Urine [E]    Lipid Panel [E]    Vitamin D [E]    Cortisol           Bettye Mora MD

## 2025-02-10 NOTE — PROGRESS NOTES
Name: Bg Johnson  YOB: 1955  Report Period: 01/28/2025 - 02/10/2025 (14 days)  Generated: 02/10/2025  Time CGM Active: 83%      Glucose Statistics and Targets  Average Glucose: 159 mg/dL  Glucose Management Indicator (GMI): 7.1%  Glucose Variability (%CV): 29.7%  Target Range: 70 - 180 mg/dL      Time in Ranges  Very High: >250 mg/dL --- 4%  High: 181 - 250 mg/dL --- 25%  Target Range: 70 - 180 mg/dL --- 71%  Low: 54 - 69 mg/dL --- 0%  Very Low: <54 mg/dL --- 0%

## 2025-02-11 NOTE — PROGRESS NOTES
Bg Johnson is a 69 year old male.   Chief Complaint   Patient presents with    New Patient    Dizziness     Vertigo       HPI:   Patient here has had longstanding hearing loss left greater than right.  Had vertigo about 1 year ago and then most recently within the past 1 week.  This started when he got out of his truck and was associated with nausea and vomiting.  It lasted hours.  He went to the emergency room where his glucose and blood pressure was noted to be within normal range.    Current Outpatient Medications   Medication Sig Dispense Refill    cyclobenzaprine 5 MG Oral Tab Take 1 tablet (5 mg total) by mouth 3 (three) times daily as needed for Muscle spasms. 30 tablet 0    meclizine 12.5 MG Oral Tab Take 1 tablet (12.5 mg total) by mouth 3 (three) times daily as needed for Dizziness. 30 tablet 0    Continuous Blood Gluc Sensor (FREESTYLE NORBERTO 2 SENSOR) Does not apply Misc Apply 1 Application topically every 14 (fourteen) days. 12 each 3    lisinopril 10 MG Oral Tab Take 1 tablet (10 mg total) by mouth daily. 90 tablet 3    Lovastatin 20 MG Oral Tab Take 1 tablet (20 mg total) by mouth nightly. 90 tablet 3    Cyanocobalamin (B-12 OR) Take by mouth.      aspirin 81 MG Oral Tab EC Take 1 tablet (81 mg total) by mouth daily.      ergocalciferol 1.25 MG (68785 UT) Oral Cap Take 1 capsule (50,000 Units total) by mouth once a week.      insulin degludec (TRESIBA FLEXTOUCH) 100 UNIT/ML Subcutaneous Solution Pen-injector Inject 18 Units into the skin in the morning and 18 Units before bedtime. 32.4 mL 0    dapagliflozin 10 MG Oral Tab Farxiga 10 mg tablet. TAKE 1 TABLET BY MOUTH ONCE DAILY 90 tablet 1    metFORMIN HCl 1000 MG Oral Tab Take 1 tablet (1,000 mg total) by mouth 2 (two) times daily with meals. 180 tablet 1    glipiZIDE 5 MG Oral Tab Take 0.5 tablets (2.5 mg total) by mouth every morning before breakfast. 45 tablet 0      Past Medical History:    Diabetes (HCC)    Diabetes 1.5, managed as type 2  (HCC)    Essential hypertension    Hyperlipidemia      Social History:  Social History     Socioeconomic History    Marital status: Single   Tobacco Use    Smoking status: Never    Smokeless tobacco: Never   Vaping Use    Vaping status: Never Used   Substance and Sexual Activity    Alcohol use: Never    Drug use: Never        REVIEW OF SYSTEMS:   GENERAL HEALTH: feels well otherwise  GENERAL : denies fever, chills, sweats, weight loss, weight gain  SKIN: denies any unusual skin lesions or rashes  RESPIRATORY: denies shortness of breath with exertion  NEURO: denies headaches    EXAM:   Ht 5' 9\" (1.753 m)   Wt 200 lb (90.7 kg)   BMI 29.53 kg/m²   System Details   Skin Inspection - Normal.   Constitutional Overall appearance - Normal.   Head/Face Facial features - Normal. Eyebrows - Normal. Skull - Normal.   Eyes Conjunctiva - Right: Normal, Left: Normal. Pupil - Right: Normal, Left: Normal.    Ears Inspection - Right: Normal, Left: Normal.   Canal - Right: Normal, Left: Normal.   TM - Right: Normal, Left: Normal.   Nasal External nose - Normal.   Nasal septum - Normal.  Turbinates - Normal.   Oral/Oropharynx Lips - Normal, Tonsils - Normal, Tongue - Normal    Neck Exam Inspection - Normal. Palpation - Normal. Parotid gland - Normal. Thyroid gland - Normal.  No carotid bruits by auscultation   Lymph Detail Submental. Submandibular. Anterior cervical. Posterior cervical. Supraclavicular all without enlargement   Psychiatric Orientation - Oriented to time, place, person & situation. Appropriate mood and affect.   Neurological Memory - Normal. Cranial nerves - Cranial nerves II through XII grossly intact.     ASSESSMENT AND PLAN:   1. Vertigo  Vertigo x 2 1 a year ago and more recently within the past 1 week.  An MRI of the brain and IACs was ordered to rule out retrocochlear disease.  If the retrocochlear workup is negative, a vestibular nystagmogram may be recommended.  Reviewed normal noncontrasted CT of the head  without contrast done at [facility] 1/30/2025.  - MRI BRAIN/IAC (ALL W+WO CNTRST) (CPT=70553); Future    2. Asymmetrical hearing loss  Patient with longstanding hearing loss left greater than right.  He will send me a copy of his last 2 audiograms.  - MRI BRAIN/IAC (ALL W+WO CNTRST) (CPT=70553); Future      The patient indicates understanding of these issues and agrees to the plan.      Babs Giang MD  2/10/2025  7:20 PM

## 2025-02-11 NOTE — PATIENT INSTRUCTIONS
.  I ordered you a MRI of the brain and IACs to rule out retrocochlear disease.  You have a longstanding asymmetric hearing loss left worse than right.  Please send me the last 2 audiograms so I can review them for you.  If the MRIs are negative, a vestibular nystagmogram will be considered.

## 2025-02-14 ENCOUNTER — PATIENT MESSAGE (OUTPATIENT)
Dept: OTOLARYNGOLOGY | Facility: CLINIC | Age: 70
End: 2025-02-14

## 2025-02-14 NOTE — TELEPHONE ENCOUNTER
Received.  Yes very definite asymmetry.  I would proceed with the MRI of the IAC's to rule out problems in the nerve, brainstem or brain.  Message sent to the patient.

## 2025-02-16 NOTE — H&P
Bg Ramos is a 69 year old male.    HPI:     Chief Complaint   Patient presents with    Physical     Reviewed Preventative/Wellness form with patient.       Mr. RAMOS is a 69 year old male PMHx DM2, HTN, HLD, coming in for annual physical examination    The L wrist cast will be coming off tomorrow. No pain. This was slipping on ice rather than a vertigo episode.    The vertigo episode occurred with spinning, nausea, dry vomiting.  He had an ER visit 1/30/2025 at [facility]. He was starting to get better in the ER that lasted for a day. He had seen the chiropractor.    He has had changes in his chronic neck pain. Was on the L side, was moreso on the R side. Dramamine made him feel weird. Neck collar -good sleep.     I reviewed and updated the PMHx, FamHx, medications, allergies, and SocHx as below with the patient    SocHX  - Home: feels safe at home  - Work: feels safe at work;  for his entire life. Was in multiple other fields - realtor/cosmetics x 40 years  - Hobbies: fishing, exercising.  - Nutrition: cutting down on carbs - not. Salads, proteins. Water  - Physical Activity: treadmill, elliptical x 3-4 weeks. Running - limited by the fracture.       HISTORY:  Past Medical History:    Diabetes (HCC)    Diabetes 1.5, managed as type 2 (HCC)    Essential hypertension    Hyperlipidemia      Past Surgical History:   Procedure Laterality Date    Colonoscopy      in [place]    Egd      25 yrs ago at [facility]      Family History   Problem Relation Age of Onset    Heart Disease Mother         late 80s    Diabetes Mother     Other (Other) Father         blood disorder: AMDS    Diabetes Maternal Grandmother       Social History:   Social History     Socioeconomic History    Marital status: Single   Tobacco Use    Smoking status: Never    Smokeless tobacco: Never   Vaping Use    Vaping status: Never Used   Substance and Sexual Activity    Alcohol use: Never    Drug use: Never        Medications  (Active prior to today's visit):  Current Outpatient Medications   Medication Sig Dispense Refill    POTASSIUM CHLORIDE OR Take by mouth. Once daily      insulin degludec (TRESIBA FLEXTOUCH) 100 UNIT/ML Subcutaneous Solution Pen-injector Inject 18 Units into the skin in the morning and 18 Units before bedtime. (Patient taking differently: Inject into the skin. 18 units every morning and 20 units at night) 32.4 mL 0    dapagliflozin 10 MG Oral Tab Farxiga 10 mg tablet. TAKE 1 TABLET BY MOUTH ONCE DAILY 90 tablet 1    metFORMIN HCl 1000 MG Oral Tab Take 1 tablet (1,000 mg total) by mouth 2 (two) times daily with meals. 180 tablet 1    glipiZIDE 5 MG Oral Tab Take 0.5 tablets (2.5 mg total) by mouth every morning before breakfast. 45 tablet 0    cyclobenzaprine 5 MG Oral Tab Take 1 tablet (5 mg total) by mouth 3 (three) times daily as needed for Muscle spasms. 30 tablet 0    meclizine 12.5 MG Oral Tab Take 1 tablet (12.5 mg total) by mouth 3 (three) times daily as needed for Dizziness. 30 tablet 0    Continuous Blood Gluc Sensor (FREESTYLE NORBERTO 2 SENSOR) Does not apply Misc Apply 1 Application topically every 14 (fourteen) days. 12 each 3    lisinopril 10 MG Oral Tab Take 1 tablet (10 mg total) by mouth daily. 90 tablet 3    Lovastatin 20 MG Oral Tab Take 1 tablet (20 mg total) by mouth nightly. 90 tablet 3    Cyanocobalamin (B-12 OR) Take by mouth.      aspirin 81 MG Oral Tab EC Take 1 tablet (81 mg total) by mouth daily.      ergocalciferol 1.25 MG (08015 UT) Oral Cap Take 1 capsule (50,000 Units total) by mouth once a week.         Allergies:  No Known Allergies      ROS:   Positive Findings indicated in BOLD    Constitutional: Fever, Chills, Weight Gain, Weight Loss, Night Sweats, Fatigue, Malaise  ENT/Mouth:  Hearing Changes, Ear Pain, Nasal Congestion, Sinus Pain, Hoarseness, Sore throat, Rhinorrhea, Swallowing Difficulty  Eyes: Eye Pain, Swelling, Redness, Foreign Body, Discharge, Vision  Changes  Cardiovascular: Chest Pain, SOB, PND, Dyspnea on Exertion, Orthopnea, Claudication, Edema, Palpitations  Respiratory: Cough, Sputum, Wheezing, Shortness of breath  Gastrointestinal: Nausea, Vomiting, Diarrhea, Constipation, Pain, Heartburn, Dysphagia, Bloody stools, Tarry stools  Genitourinary: Dysmenorrhea, Dysuria, Urinary Frequency, Hematuria, Urinary Incontinence, Urgency,  Flank Pain  Musculoskeletal: Arthralgias, Myalgias, Joint Swelling, Joint Stiffness, Back Pain, Neck Pain  Integumentary: Skin Lesions, Pruritis, Hair Changes, Jaundice, Nail changes  Neuro: Weakness, Numbness, Paresthesias, Loss of Consciousness, Syncope, Dizziness, Headache, Falls  Psych: Anxiety, Depression, Insomnia, Suicidal Ideation, Homicidal ideation, Memory Changes  Heme/Lymph: Bruising, Bleeding, Lymphadenopathy  Endocrine: Polyuria, Polydipsia, Temperature Intolerance    PHYSICAL EXAM:   Vital Signs:  Blood pressure 136/64, pulse 78, temperature 98.1 °F (36.7 °C), temperature source Oral, height 5' 9\" (1.753 m), weight 208 lb 9.6 oz (94.6 kg), SpO2 98%.     Constitutional: No acute distress. Alert and oriented x 3.  Eyes: EOMI, PERRLA, clear sclera b/l  HENT: NCAT, Moist mucous membranes, Oropharynx without erythema or exudates  Neck: No JVD, no thyromegaly  Cardiovascular: S1, S2, no S3, no S4, Regular rate and rhythm, No murmurs/gallops/rubs.   Vascular: Equal pulses 2+ carotids no bruits or thrills/radial/DP/PT bilaterally  Respiratory: Clear to auscultation bilaterally.  No wheezes/rales/rhonchi  Gastrointestinal: Soft, nontender, nondistended. Positive bowel sounds x 4. No rebound tenderness. No hepatomegaly, No splenomegaly  Genitourinary: No CVA tenderness bilaterally  -Prostate exam deferred  Neurologic: No focal neurological deficits, CN II-XII intact, light touch intact, MSK Strength 5/5 and symmetric in all extremities, normal gait, 2+ patellar tendon  Musculoskeletal: Full range of motion of all extremities, no  clubbing/swelling/edema  Skin: No lesions, No erythema, no jaundice, Cap Refill < 2s  Psychiatric: Appropriate mood and affect  Heme/Lymph/Immune: No cervical LAD    Diabetic foot examination  - No visible ulcers, wounds  - dystrophic nails  - Vibration sense intact bilaterally -premature termination  - Monofilament x 4 areas bilateral and symmetrical-great big toe, plantar surface, dorsal surface, lateral foot        DATA REVIEWED   Labs:  No results found for this or any previous visit (from the past 8760 hours).    No results found for this or any previous visit (from the past 8760 hours).    Cholesterol, Total (mg/dL)   Date Value   02/12/2024 154     HDL Cholesterol (mg/dL)   Date Value   02/12/2024 51     LDL Cholesterol (mg/dL)   Date Value   02/12/2024 86     Triglycerides (mg/dL)   Date Value   02/12/2024 92       Last A1c value was 7.8% done 2/10/2025.    X-ray left wrist 2/7/2025    Xray through cast  Left distal radius chauffer fracture, nondisplaced     ASSESSMENT/PLAN:     Mechanical fall  - Slipped on ice resulting in left distal radius fracture  - Following with [facility], last seen 2/7/2025.  Casting with conservative measures.  - Has follow-up in [facility] this week for cast removal    Type 2 diabetes  Recent A1c:   HEMOGLOBIN A1C (%)   Date Value   02/10/2025 7.8 (A)     Recent urine microalbumin: No components found for: \"URINEMICROALBUMIN\"  Current medications: Updated as below  Eye exam: Will see Dr. Wells  Foot exam: Check feet daily  - Established with Dr. Mora last seen 2/10/2025.  Patient deferred use of GLP agonist.  Started on glipizide 2.5 mg in the morning, metformin 1000 mg twice a day, Farxiga 10 mg daily.  Tresiba was decreased to 18 units twice a day     Hypertension  -Blood pressure today at goal  - Check blood pressures at home  - Continue with home lisinopril 10 mg daily     Hyperlipidemia  - Repeat fasting lipid panel overall well-controlled  - Continue with  lovastatin 20 mg nightly     Glaucoma  Prior history  -Going to follow-up with ophthalmology    Vertigo  - Chronic recurrent issue.  Reviewed ER visit 1/30/2025.  -Initially saw Dr. Chavez 2/20/2025.  Planning for MRI of the brain to rule out acoustic neuroma.  Recommended ENT evaluation.  Undergoing cardiac workup for neck pain  - Was seen by ENT Dr. Giang 2/10/2025  -Plan for MRI of the brain  - Continue on aspirin 81 mg daily     Vitamin B-12 deficiency  Noted prior history  - Vitamin B12 greater than 2000, should take vitamin B12 every other day     Vitamin D deficiency  Noted prior history  - Vitamin D at goal            Orders This Visit:  Orders Placed This Encounter   Procedures    CBC With Differential With Platelet    Comp Metabolic Panel (14)    PSA Total, Screen    Vitamin B12    Magnesium [E]       Meds This Visit:  Requested Prescriptions      No prescriptions requested or ordered in this encounter       Imaging & Referrals:  None       Health Maintenance  HTN Screen: BP at goal  DM Screen: Check A1c/fasting sugar  HLD Screen: Check fasting lipid panel  HCV Screen: Considered low risk  HIV Screen: considered low risk  G/C/Syphilis: Considered low risk    Colon Cancer Screening (45-70): Up-to-date last colonoscopy 5/2023, next due in 10 years -   Prostate Cancer Screening: (50-70): Check PSA  Lung Cancer Screening (55-79 with 30 p/year and active < 15 years): Not indicated  AAA Screening (65-75 Hx of smoking): Not indicated    Influenza: Annually   Td/Tdap: Last Tdap assess at next visit  Zoster (50+): Recommended 2 doses Shringrix - may have obtained with prior  HPV (19-26): Not indicated  Pneumococcal: UTD    Immunization History   Administered Date(s) Administered    >=3 YRS TRI  MULTIDOSE VIAL (97278) FLU CLINIC 12/27/2012    Covid-19 Vaccine Moderna 100 mcg/0.5 ml 01/23/2021, 02/15/2021    Covid-19 Vaccine Moderna Bivalent 50mcg/0.5mL 12+ years 05/09/2023    Covid-19 Vaccine Pfizer 30 mcg/0.3 ml  01/23/2021, 02/13/2021, 09/25/2021    Covid-19 Vaccine Pfizer Bivalent 30mcg/0.3mL 09/10/2022    Covid-19 Vaccine Pfizer Walter-Sucrose 30 mcg/0.3 ml 04/16/2022    FLU VAC High Dose 65 YRS & Older PRSV Free (43929) 11/13/2022, 10/12/2023    FLUZONE 6 months and older PFS 0.5 ml (34539) 10/10/2019, 10/22/2020, 09/16/2021    Flucelvax 0.5 Ml Quad Multi-dose Vial 12/21/2013    Flucelvax Influenza vaccine, trivalent (ccIIV3), 0.5mL IM 12/21/2013, 10/24/2014    High Dose Fluzone Influenza Vaccine, 65yr+ PF 0.5mL (34136) 09/29/2024    Influenza 10/24/2014    Influenza Vaccine, trivalent (IIV3), PF 0.5mL (11165) 09/24/2015, 10/25/2017, 10/04/2018    Pfizer Covid-19 Vaccine 30mcg/0.3ml 12yrs+ 09/30/2023, 04/16/2024, 09/20/2024    Pneumococcal (Prevnar 13) 10/31/2019    Pneumovax 23 09/09/2021    RSV, bivalent, diluent reconstituted PF (Abrysvo) 10/12/2023    Zoster Vaccine Live (Zostavax) 03/15/2017         Return to clinic in 6 months follow-up for DM    Fredy Jacobson MD, 02/17/25, 2:35 PM

## 2025-02-16 NOTE — PATIENT INSTRUCTIONS
- You were seen in clinic for regular annual check-up. We have ordered labs for you and we will call you with the results. Please obtain the bloodwork fasting for 12 hours. OK to drink water the day of your blood draw.      We have the lab downstairs on the first floor.  No appointment is necessary.  Lab hours are Monday-Friday 7:30 AM to 4:45 PM.  There may be Saturday hours 7 AM-11:00 AM as well.     Otherwise you can obtain the blood tests on the weekends at the main hospital or local immediate care/urgent care within the [facility].    Sugars are better controlled with assistance from Dr. Mora of endocrinology  - Lets continue with the same regiment in the meantime  -Continue with targeting weight loss over time and good nutrition exercise    Following up with orthopedic surgery for management of the radius fracture.    Lets also continue with the neurological and ENT workup for the chronic intermittent vertigo.  Goal is to reduce risk for further falls in the future, though this seems to be mechanical fall due to slipping on ice  - Will await the cardiac and neurological workup ordered by Alphonse Chavez and Dr. Giang    -Periodically check blood pressures at home.  Notify us if increasing  -You may be due for eye examination for monitoring of yearly diabetic eye checks - please make an appointment with Dr. Wells  - Your next colonoscopy will be due in 10 years with Dr. Spangler - 2033  -Please continue checking your blood pressures at home and notify us if they are increasing   - please continue checking your blood sugars at home and notify us if they are increasing  - Vaccines may be due for: Tetanus/Tdap,We recommended checking with your insurance for coverage of Shingrix, a 2-dose shingles vaccine that can be obtained at the pharmacy if there is adequate coverage through your insurance plan.    - Please continue to eat a varied diet including recommended servings of vegetables, fruits, and  low fat dairy. Minimize high saturated fats (such as fast foods) and high sugar intake (such as soda)  - We recommend 150 minutes of moderate intensity exercise (brisk walking, swimming) weekly to maintain your current weight.  Targeted weight loss will require more vigorous exercise or more than 150 minutes/week.    Return to clinic in 6 months for follow-up of diabetes

## 2025-02-17 ENCOUNTER — OFFICE VISIT (OUTPATIENT)
Dept: INTERNAL MEDICINE CLINIC | Facility: CLINIC | Age: 70
End: 2025-02-17

## 2025-02-17 VITALS
OXYGEN SATURATION: 98 % | HEIGHT: 69 IN | HEART RATE: 78 BPM | WEIGHT: 208.63 LBS | BODY MASS INDEX: 30.9 KG/M2 | DIASTOLIC BLOOD PRESSURE: 64 MMHG | SYSTOLIC BLOOD PRESSURE: 136 MMHG | TEMPERATURE: 98 F

## 2025-02-17 DIAGNOSIS — E11.65 TYPE 2 DIABETES MELLITUS WITH HYPERGLYCEMIA, WITHOUT LONG-TERM CURRENT USE OF INSULIN (HCC): ICD-10-CM

## 2025-02-17 DIAGNOSIS — R42 VERTIGO: ICD-10-CM

## 2025-02-17 DIAGNOSIS — E78.2 MIXED HYPERLIPIDEMIA: ICD-10-CM

## 2025-02-17 DIAGNOSIS — Z00.00 ANNUAL PHYSICAL EXAM: Primary | ICD-10-CM

## 2025-02-17 DIAGNOSIS — Z13.0 SCREENING FOR DEFICIENCY ANEMIA: ICD-10-CM

## 2025-02-17 DIAGNOSIS — Z13.89 SCREENING FOR NEPHROPATHY: ICD-10-CM

## 2025-02-17 DIAGNOSIS — Z12.5 SCREENING FOR PROSTATE CANCER: ICD-10-CM

## 2025-02-17 DIAGNOSIS — S52.502S CLOSED FRACTURE OF DISTAL END OF LEFT RADIUS, UNSPECIFIED FRACTURE MORPHOLOGY, SEQUELA: ICD-10-CM

## 2025-02-17 DIAGNOSIS — E83.42 HYPOMAGNESEMIA: ICD-10-CM

## 2025-02-17 DIAGNOSIS — Z13.29 SCREENING FOR THYROID DISORDER: ICD-10-CM

## 2025-02-17 DIAGNOSIS — E53.8 VITAMIN B12 DEFICIENCY: ICD-10-CM

## 2025-02-17 DIAGNOSIS — I10 PRIMARY HYPERTENSION: ICD-10-CM

## 2025-02-19 ENCOUNTER — LAB ENCOUNTER (OUTPATIENT)
Dept: LAB | Age: 70
End: 2025-02-19
Attending: INTERNAL MEDICINE
Payer: COMMERCIAL

## 2025-02-19 ENCOUNTER — TELEPHONE (OUTPATIENT)
Dept: INTERNAL MEDICINE CLINIC | Facility: CLINIC | Age: 70
End: 2025-02-19

## 2025-02-19 DIAGNOSIS — Z00.00 ANNUAL PHYSICAL EXAM: ICD-10-CM

## 2025-02-19 DIAGNOSIS — G45.9 TIA (TRANSIENT ISCHEMIC ATTACK): ICD-10-CM

## 2025-02-19 DIAGNOSIS — Z12.5 SCREENING FOR PROSTATE CANCER: ICD-10-CM

## 2025-02-19 DIAGNOSIS — E11.21 DIABETIC NEPHROPATHY ASSOCIATED WITH TYPE 2 DIABETES MELLITUS (HCC): ICD-10-CM

## 2025-02-19 DIAGNOSIS — E55.9 VITAMIN D DEFICIENCY: ICD-10-CM

## 2025-02-19 DIAGNOSIS — E11.65 TYPE 2 DIABETES MELLITUS WITH HYPERGLYCEMIA, WITHOUT LONG-TERM CURRENT USE OF INSULIN (HCC): ICD-10-CM

## 2025-02-19 DIAGNOSIS — R42 LIGHTHEADEDNESS: ICD-10-CM

## 2025-02-19 DIAGNOSIS — E78.5 DYSLIPIDEMIA: ICD-10-CM

## 2025-02-19 DIAGNOSIS — E83.42 HYPOMAGNESEMIA: ICD-10-CM

## 2025-02-19 DIAGNOSIS — E53.8 VITAMIN B12 DEFICIENCY: ICD-10-CM

## 2025-02-19 DIAGNOSIS — Z13.0 SCREENING FOR DEFICIENCY ANEMIA: ICD-10-CM

## 2025-02-19 DIAGNOSIS — R42 VERTIGO: ICD-10-CM

## 2025-02-19 LAB
ALBUMIN SERPL-MCNC: 4.4 G/DL (ref 3.2–4.8)
ALBUMIN/GLOB SERPL: 1.5 {RATIO} (ref 1–2)
ALP LIVER SERPL-CCNC: 63 U/L
ALT SERPL-CCNC: 11 U/L
ANION GAP SERPL CALC-SCNC: 10 MMOL/L (ref 0–18)
AST SERPL-CCNC: 12 U/L (ref ?–34)
BASOPHILS # BLD AUTO: 0.03 X10(3) UL (ref 0–0.2)
BASOPHILS NFR BLD AUTO: 0.5 %
BILIRUB SERPL-MCNC: 0.4 MG/DL (ref 0.2–1.1)
BUN BLD-MCNC: 24 MG/DL (ref 9–23)
BUN/CREAT SERPL: 22.6 (ref 10–20)
CALCIUM BLD-MCNC: 9.8 MG/DL (ref 8.7–10.4)
CHLORIDE SERPL-SCNC: 107 MMOL/L (ref 98–112)
CHOLEST SERPL-MCNC: 147 MG/DL (ref ?–200)
CO2 SERPL-SCNC: 24 MMOL/L (ref 21–32)
COMPLEXED PSA SERPL-MCNC: 3.21 NG/ML (ref ?–4)
CORTIS SERPL-MCNC: 17.2 UG/DL
CREAT BLD-MCNC: 1.06 MG/DL
CREAT UR-SCNC: 117.2 MG/DL
DEPRECATED RDW RBC AUTO: 42.2 FL (ref 35.1–46.3)
EGFRCR SERPLBLD CKD-EPI 2021: 76 ML/MIN/1.73M2 (ref 60–?)
EOSINOPHIL # BLD AUTO: 0.25 X10(3) UL (ref 0–0.7)
EOSINOPHIL NFR BLD AUTO: 4 %
ERYTHROCYTE [DISTWIDTH] IN BLOOD BY AUTOMATED COUNT: 12.3 % (ref 11–15)
FASTING PATIENT LIPID ANSWER: YES
FASTING STATUS PATIENT QL REPORTED: YES
GLOBULIN PLAS-MCNC: 2.9 G/DL (ref 2–3.5)
GLUCOSE BLD-MCNC: 119 MG/DL (ref 70–99)
HCT VFR BLD AUTO: 44.1 %
HDLC SERPL-MCNC: 45 MG/DL (ref 40–59)
HGB BLD-MCNC: 14.7 G/DL
IMM GRANULOCYTES # BLD AUTO: 0.01 X10(3) UL (ref 0–1)
IMM GRANULOCYTES NFR BLD: 0.2 %
LDLC SERPL CALC-MCNC: 88 MG/DL (ref ?–100)
LYMPHOCYTES # BLD AUTO: 1.28 X10(3) UL (ref 1–4)
LYMPHOCYTES NFR BLD AUTO: 20.7 %
MAGNESIUM SERPL-MCNC: 1.5 MG/DL (ref 1.6–2.6)
MCH RBC QN AUTO: 30.8 PG (ref 26–34)
MCHC RBC AUTO-ENTMCNC: 33.3 G/DL (ref 31–37)
MCV RBC AUTO: 92.3 FL
MICROALBUMIN UR-MCNC: 7 MG/DL
MICROALBUMIN/CREAT 24H UR-RTO: 59.7 UG/MG (ref ?–30)
MONOCYTES # BLD AUTO: 0.51 X10(3) UL (ref 0.1–1)
MONOCYTES NFR BLD AUTO: 8.2 %
NEUTROPHILS # BLD AUTO: 4.11 X10 (3) UL (ref 1.5–7.7)
NEUTROPHILS # BLD AUTO: 4.11 X10(3) UL (ref 1.5–7.7)
NEUTROPHILS NFR BLD AUTO: 66.4 %
NONHDLC SERPL-MCNC: 102 MG/DL (ref ?–130)
OSMOLALITY SERPL CALC.SUM OF ELEC: 297 MOSM/KG (ref 275–295)
PLATELET # BLD AUTO: 274 10(3)UL (ref 150–450)
POTASSIUM SERPL-SCNC: 4.5 MMOL/L (ref 3.5–5.1)
PROT SERPL-MCNC: 7.3 G/DL (ref 5.7–8.2)
RBC # BLD AUTO: 4.78 X10(6)UL
SODIUM SERPL-SCNC: 141 MMOL/L (ref 136–145)
TRIGL SERPL-MCNC: 72 MG/DL (ref 30–149)
VIT B12 SERPL-MCNC: 637 PG/ML (ref 211–911)
VIT D+METAB SERPL-MCNC: 108.1 NG/ML (ref 30–100)
VLDLC SERPL CALC-MCNC: 12 MG/DL (ref 0–30)
WBC # BLD AUTO: 6.2 X10(3) UL (ref 4–11)

## 2025-02-19 PROCEDURE — 80061 LIPID PANEL: CPT | Performed by: OTHER

## 2025-02-19 NOTE — TELEPHONE ENCOUNTER
Please notify the patient reviewed the blood work from today  - Magnesium levels were low at 1.5 with goal 2.0 or higher.  Therefore recommend a temporary magnesium supplement over-the-counter: Magnesium sulfate 200 mg once a day for 1 week.  Lets see if this helps with the episodes of vertigo  - Mild protein in the urine, but this is best managed with further blood pressure control    All other blood work looks good with good control of cholesterol, normal vitamin B12 levels, blood counts.

## 2025-02-20 ENCOUNTER — TELEPHONE (OUTPATIENT)
Dept: ENDOCRINOLOGY CLINIC | Facility: CLINIC | Age: 70
End: 2025-02-20

## 2025-02-20 DIAGNOSIS — R79.89 HIGH SERUM VITAMIN D: Primary | ICD-10-CM

## 2025-02-21 NOTE — TELEPHONE ENCOUNTER
Please call the patient if [employer] message has not been read by 2/21/2025.  Patient's vitamin D is over 100 and hence needs to stop it ASAP.  Thanks.

## 2025-02-24 ENCOUNTER — TELEPHONE (OUTPATIENT)
Dept: NEUROLOGY | Facility: CLINIC | Age: 70
End: 2025-02-24

## 2025-02-24 NOTE — TELEPHONE ENCOUNTER
Called  patient who states that he is taking Lovastatin 20 mg tab- one tablet (20 mg) by mouth nightly and is okay with adjusting the dosage of his statin.      ----- Message from Austyn Chavez sent at 2/19/2025  3:51 PM CST -----  Please let the patient know that LDL is still higher than 70 indicating somewhat higher possibility of stroke, therefore I would suggest to adjust the dose of statin. If they agree we can send a new prescription

## 2025-02-25 RX ORDER — LOVASTATIN 20 MG/1
40 TABLET ORAL NIGHTLY
Qty: 180 TABLET | Refills: 3 | Status: SHIPPED | OUTPATIENT
Start: 2025-02-25

## 2025-02-28 ENCOUNTER — HOSPITAL ENCOUNTER (OUTPATIENT)
Dept: CT IMAGING | Facility: HOSPITAL | Age: 70
Discharge: HOME | End: 2025-02-28
Attending: Other
Payer: COMMERCIAL

## 2025-02-28 DIAGNOSIS — G45.9 TIA (TRANSIENT ISCHEMIC ATTACK): ICD-10-CM

## 2025-02-28 DIAGNOSIS — R42 VERTIGO: ICD-10-CM

## 2025-02-28 PROCEDURE — 70498 CT ANGIOGRAPHY NECK: CPT | Performed by: OTHER

## 2025-02-28 PROCEDURE — 70496 CT ANGIOGRAPHY HEAD: CPT | Performed by: OTHER

## 2025-03-04 ENCOUNTER — HOSPITAL ENCOUNTER (OUTPATIENT)
Dept: MRI IMAGING | Age: 70
Discharge: HOME | End: 2025-03-04
Attending: SPECIALIST
Payer: COMMERCIAL

## 2025-03-04 DIAGNOSIS — R42 VERTIGO: ICD-10-CM

## 2025-03-04 DIAGNOSIS — H91.8X3 ASYMMETRICAL HEARING LOSS: ICD-10-CM

## 2025-03-04 PROCEDURE — 70553 MRI BRAIN STEM W/O & W/DYE: CPT | Performed by: SPECIALIST

## 2025-03-04 PROCEDURE — A9575 INJ GADOTERATE MEGLUMI 0.1ML: HCPCS | Performed by: SPECIALIST

## 2025-03-04 RX ORDER — GADOTERATE MEGLUMINE 376.9 MG/ML
20 INJECTION INTRAVENOUS
Status: COMPLETED | OUTPATIENT
Start: 2025-03-04 | End: 2025-03-04

## 2025-03-04 RX ADMIN — GADOTERATE MEGLUMINE 20 ML: 376.9 INJECTION INTRAVENOUS at 13:21:00

## 2025-03-10 ENCOUNTER — PATIENT MESSAGE (OUTPATIENT)
Dept: OTOLARYNGOLOGY | Facility: CLINIC | Age: 70
End: 2025-03-10

## 2025-03-10 NOTE — PROGRESS NOTES
Very minor abnormalities on the MRI.  Nothing to account for the asymmetry.  There is a very small chronic pontine infarct.  If dizziness symptoms persist a vestibular nystagmogram is recommended.

## 2025-03-10 NOTE — TELEPHONE ENCOUNTER
Can be the abnormality in the swathi or more likely something happened to the nerve, like a viral infection and the hearing was lost.  This only rules out a tumor, stroke or multiple sclerosis.  There looks like there was a very small stroke in the left brainstem.in the past.  Message sent to the patient.

## 2025-03-25 ENCOUNTER — HOSPITAL ENCOUNTER (OUTPATIENT)
Dept: BONE DENSITY | Age: 70
Discharge: HOME | End: 2025-03-25
Attending: INTERNAL MEDICINE
Payer: COMMERCIAL

## 2025-03-25 ENCOUNTER — OFFICE VISIT (OUTPATIENT)
Dept: NEUROLOGY | Facility: CLINIC | Age: 70
End: 2025-03-25
Payer: COMMERCIAL

## 2025-03-25 VITALS — DIASTOLIC BLOOD PRESSURE: 92 MMHG | OXYGEN SATURATION: 98 % | SYSTOLIC BLOOD PRESSURE: 162 MMHG | HEART RATE: 72 BPM

## 2025-03-25 DIAGNOSIS — Z13.820 OSTEOPOROSIS SCREENING: ICD-10-CM

## 2025-03-25 DIAGNOSIS — R42 VERTIGO: Primary | ICD-10-CM

## 2025-03-25 DIAGNOSIS — T14.8XXA FRACTURE: ICD-10-CM

## 2025-03-25 DIAGNOSIS — G45.9 TIA (TRANSIENT ISCHEMIC ATTACK): ICD-10-CM

## 2025-03-25 PROCEDURE — 99214 OFFICE O/P EST MOD 30 MIN: CPT | Performed by: OTHER

## 2025-03-25 PROCEDURE — 3077F SYST BP >= 140 MM HG: CPT | Performed by: OTHER

## 2025-03-25 PROCEDURE — 77080 DXA BONE DENSITY AXIAL: CPT | Performed by: INTERNAL MEDICINE

## 2025-03-25 PROCEDURE — 3080F DIAST BP >= 90 MM HG: CPT | Performed by: OTHER

## 2025-03-25 NOTE — PROGRESS NOTES
[facility] [address], [address]  [facility] [address]  [phone]            Neurology follow-up visit     Referred By: Dr. Reese ref. provider found    Chief Complaint:   Chief Complaint   Patient presents with    Neurologic Problem     LOV 02/05/25  Pt here for follow up on testing and states that the dizziness has passed.        HPI:     Bg Johnson is a 69 year old male, who presents for vertigo.   The patient presents with recurrent episodes of vertigo. The most recent episode occurred last Thursday, when the patient experienced sudden onset of severe vertigo upon exiting his vehicle, nearly causing him to collapse. The episode was accompanied by nausea and vomiting, persisting for 3-4 hours. The patient called emergency services and was transported to [facility].    The patient reports a similar episode approximately one year ago, which was associated with extreme neck pain. The neck pain persisted for about 4 months, eventually resolving after rehabilitation. A third episode occurred prior to these, also resolving within 30 minutes but followed by prolonged neck pain.    The patient denies any specific triggers for these episodes and reports no pain associated with the most recent event. He notes feeling delirious during the acute phase. The patient also mentions having reduced hearing in his left ear compared to the right, for which he uses hearing aids. An MRI for acoustic neuroma was performed 20 years ago.    Regarding ENT, the left ear is weaker in hearing than the right ear.    History of Present Illness      Previous evaluations, including an MRI conducted years ago, did not reveal any significant findings. Recent testing, including an MRI and CT angiogram of the head and neck, also showed no major abnormalities such as strokes, tumors, or multiple sclerosis.  Except questionable vision, possible small stroke in the past in the swathi.    He  recalls a significant fall about a year ago where he hit his head hard, which he speculates might have contributed to his symptoms. However, he acknowledges that this incident is unlikely to have caused the specific brain findings noted in recent imaging.    He is currently taking aspirin and has been managing his blood pressure and cholesterol levels. He mentions taking one and a half pills of his cholesterol medication, which he has recently increased. He is also engaging in exercise and dietary modifications to aid in managing his conditions.        Past Medical History:    Diabetes (HCC)    Diabetes 1.5, managed as type 2 (HCC)    Essential hypertension    Hyperlipidemia       Past Surgical History:   Procedure Laterality Date    Colonoscopy      in [facility]      25 yrs ago at [facility]       Social history:  History   Smoking Status    Never   Smokeless Tobacco    Never     History   Alcohol Use Never     History   Drug Use Unknown       Family History   Problem Relation Age of Onset    Heart Disease Mother         late 80s    Diabetes Mother     Other (Other) Father         blood disorder: AMDS    Diabetes Maternal Grandmother          Current Outpatient Medications:     Lovastatin 20 MG Oral Tab, Take 2 tablets (40 mg total) by mouth nightly., Disp: 180 tablet, Rfl: 3    POTASSIUM CHLORIDE OR, Take by mouth. Once daily, Disp: , Rfl:     insulin degludec (TRESIBA FLEXTOUCH) 100 UNIT/ML Subcutaneous Solution Pen-injector, Inject 18 Units into the skin in the morning and 18 Units before bedtime., Disp: 32.4 mL, Rfl: 0    dapagliflozin 10 MG Oral Tab, Farxiga 10 mg tablet. TAKE 1 TABLET BY MOUTH ONCE DAILY, Disp: 90 tablet, Rfl: 1    metFORMIN HCl 1000 MG Oral Tab, Take 1 tablet (1,000 mg total) by mouth 2 (two) times daily with meals., Disp: 180 tablet, Rfl: 1    glipiZIDE 5 MG Oral Tab, Take 0.5 tablets (2.5 mg total) by mouth every morning before breakfast., Disp: 45 tablet, Rfl: 0    cyclobenzaprine 5  MG Oral Tab, Take 1 tablet (5 mg total) by mouth 3 (three) times daily as needed for Muscle spasms., Disp: 30 tablet, Rfl: 0    meclizine 12.5 MG Oral Tab, Take 1 tablet (12.5 mg total) by mouth 3 (three) times daily as needed for Dizziness., Disp: 30 tablet, Rfl: 0    lisinopril 10 MG Oral Tab, Take 1 tablet (10 mg total) by mouth daily., Disp: 90 tablet, Rfl: 3    Cyanocobalamin (B-12 OR), Take by mouth., Disp: , Rfl:     aspirin 81 MG Oral Tab EC, Take 1 tablet (81 mg total) by mouth daily., Disp: , Rfl:     Continuous Blood Gluc Sensor (FREESTYLE NORBERTO 2 SENSOR) Does not apply Misc, Apply 1 Application topically every 14 (fourteen) days., Disp: 12 each, Rfl: 3    ergocalciferol 1.25 MG (46802 UT) Oral Cap, Take 1 capsule (50,000 Units total) by mouth once a week. (Patient not taking: Reported on 3/25/2025), Disp: , Rfl:     Allergies[1]    ROS:   As in HPI, the rest of the 14 system review was done and was negative      Physical Exam:  Vitals:    03/25/25 0749   BP: (!) 162/92   Pulse: 72   SpO2: 98%       General: No apparent distress, well nourished, well groomed.  Head- Normocephalic, atraumatic  Eyes- No redness or swelling  ENT- Hearing intake, normal glutition  Neck- No masses or adenopathy  Cv: pulses were palpable and normal, no cyanosis or edema     Neurological:     Mental Status- Alert and oriented x3.  Normal attention span and concentration  Thought process intact  Memory intact- recent and remote  Mood intact  Fund of knowledge appropriate for education and age    Language intact including: comprehension, naming, repetition, vocabulary    Cranial Nerves:  II.- Visual fields full to confrontation    III, IV, VI- EOM intact, OLAF  V. Facial sensation intact  VII. Face symmetric, no facial weakness  VIII. Hearing intact to whisper.  IX. Pallet elevates symmetrically.  XI. Shoulder shrug is intact  XII. Tongue is midline    Motor Exam:  Muscle tone normal  No atrophy or fasciculations  Strength- upper  extremities 5/5 proximally and distally                  - lower  extremities 5/5 proximally and distally    Sensory Exam:  Light touch sensation- intact in all 4 extremities    Coordination:  Finger to nose intact  Rapid alternating movements intact    Gait:  Normal posture  Normal physiologic      Labs:    Lab Results   Component Value Date    TSH 1.907 02/12/2024     Lab Results   Component Value Date    HDL 45 02/19/2025    LDL 88 02/19/2025    TRIG 72 02/19/2025     Lab Results   Component Value Date    HGB 14.7 02/19/2025    HCT 44.1 02/19/2025    MCV 92.3 02/19/2025    WBC 6.2 02/19/2025    .0 02/19/2025      Lab Results   Component Value Date    BUN 24 (H) 02/19/2025    CA 9.8 02/19/2025    ALT 11 02/19/2025    AST 12 02/19/2025    ALB 4.4 02/19/2025     02/19/2025    K 4.5 02/19/2025     02/19/2025    CO2 24.0 02/19/2025      I have reviewed labs.    MRI of the brain was independently reviewed as above.    Assessment   1. Vertigo  Recurrent vertigo:  Patient presents with recurrent episodes of vertigo, with the most recent severe episode occurring last Thursday. The episode was characterized by sudden onset of severe dizziness, near-collapse, nausea, and vomiting, lasting approximately 3-4 hours. This is the third occurrence, with previous episodes about a year ago.    Assessment & Plan  Transient Ischemic Attack (TIA)  Dizziness and hearing loss may be related to TIA or ear issues. MRI and CT angiogram ruled out major strokes, tumors, and blockages. TIA considered; stroke prevention emphasized.  - Continue aspirin therapy.  - Ensure blood pressure control.  - Increased statin dosage.  - Encourage regular exercise and healthy diet.  - Schedule echocardiogram and cardiac monitor.    Dizziness and Hearing Loss  Long-standing dizziness and hearing loss may be ear-related or past TIA. Viral infection considered to cause some residual effects.  - Complete cardiac workup with echocardiogram and  cardiac monitor.    Hypertension  Blood pressure not well-controlled, significant stroke risk.  - Consult primary care physician to adjust antihypertensive medications.  - Encourage regular exercise and healthy diet.    Hyperlipidemia  Cholesterol levels suboptimal, require better management.  - Increase statin dosage.  - Encourage regular exercise and healthy diet.    Head Trauma  Significant head trauma a year ago, unlikely related to brain imaging findings.         2. TIA (transient ischemic attack)  Continue with aspirin and treatment of underlying risk factors.    - CARD ECHO 2D W DOPPLER/BUBBLES (CPT=93306); Future  - CARD ZIO EXTENDED MONITOR 8-14 DAYS (CPT=93246/66010); Future    3. Neck pain    Patient is already working with physical therapy.           Education and counseling provided to patient. Instructed patient to call my office or seek medical attention immediately if symptoms worsen.  Patient verbalized understanding of information given. All questions were answered. All side effects of drugs were discussed.       Return to clinic in: No follow-ups on file.    Austyn Chavez MD           [1] No Known Allergies

## 2025-04-11 RX ORDER — GLIPIZIDE 5 MG/1
2.5 TABLET ORAL
Qty: 45 TABLET | Refills: 0 | Status: SHIPPED | OUTPATIENT
Start: 2025-04-11

## 2025-04-14 NOTE — TELEPHONE ENCOUNTER
Do not see record of this being prescribed    Current refill request refused due to refill is either a duplicate request or has active refills at the pharmacy.  Check previous templates.    Requested Prescriptions     Pending Prescriptions Disp Refills    POTASSIUM CHLORIDE OR  0     Sig: Take by mouth. Once daily

## 2025-04-17 NOTE — TELEPHONE ENCOUNTER
Refill request is for a maintenance medication and has met the criteria specified in the Ambulatory Medication Refill Standing Order for eligibility, visits, laboratory, alerts and was sent to the requested pharmacy.    Requested Prescriptions     Signed Prescriptions Disp Refills    Continuous Glucose Sensor (FREESTYLE NORBERTO 2 SENSOR) Does not apply Misc 6 each 3     Sig: APPLY 1 EVERY 14 DAYS     Authorizing Provider: YUNIER CONNELL     Ordering User: ANURAG ROMERO

## 2025-04-18 ENCOUNTER — PATIENT MESSAGE (OUTPATIENT)
Dept: ENDOCRINOLOGY CLINIC | Facility: CLINIC | Age: 70
End: 2025-04-18

## 2025-04-18 DIAGNOSIS — E11.69 TYPE 2 DIABETES MELLITUS WITH OTHER SPECIFIED COMPLICATION, WITHOUT LONG-TERM CURRENT USE OF INSULIN (HCC): Primary | ICD-10-CM

## 2025-05-08 ENCOUNTER — PATIENT MESSAGE (OUTPATIENT)
Dept: INTERNAL MEDICINE CLINIC | Facility: CLINIC | Age: 70
End: 2025-05-08

## 2025-05-09 ENCOUNTER — OFFICE VISIT (OUTPATIENT)
Dept: ENDOCRINOLOGY CLINIC | Facility: CLINIC | Age: 70
End: 2025-05-09
Payer: COMMERCIAL

## 2025-05-09 VITALS
DIASTOLIC BLOOD PRESSURE: 80 MMHG | WEIGHT: 215.81 LBS | SYSTOLIC BLOOD PRESSURE: 133 MMHG | RESPIRATION RATE: 18 BRPM | HEART RATE: 83 BPM | BODY MASS INDEX: 31.96 KG/M2 | HEIGHT: 69 IN

## 2025-05-09 DIAGNOSIS — E11.21 DIABETIC NEPHROPATHY ASSOCIATED WITH TYPE 2 DIABETES MELLITUS (HCC): ICD-10-CM

## 2025-05-09 DIAGNOSIS — M85.80 OSTEOPENIA, UNSPECIFIED LOCATION: ICD-10-CM

## 2025-05-09 DIAGNOSIS — R79.89 HIGH SERUM VITAMIN D: ICD-10-CM

## 2025-05-09 DIAGNOSIS — E11.69 TYPE 2 DIABETES MELLITUS WITH OTHER SPECIFIED COMPLICATION, WITHOUT LONG-TERM CURRENT USE OF INSULIN (HCC): Primary | ICD-10-CM

## 2025-05-09 DIAGNOSIS — E78.5 DYSLIPIDEMIA: ICD-10-CM

## 2025-05-09 LAB
GLUCOSE BLOOD: 201
HEMOGLOBIN A1C: 7.2 % (ref 4.3–5.6)
TEST STRIP LOT #: NORMAL NUMERIC

## 2025-05-09 RX ORDER — ALENDRONATE SODIUM 70 MG/1
70 TABLET ORAL
Qty: 12 TABLET | Refills: 1 | Status: SHIPPED | OUTPATIENT
Start: 2025-05-09

## 2025-05-09 RX ORDER — GLIPIZIDE 5 MG/1
5 TABLET ORAL
Qty: 90 TABLET | Refills: 0 | Status: SHIPPED | OUTPATIENT
Start: 2025-05-09

## 2025-05-09 NOTE — PROGRESS NOTES
Name: Bg Johnson  YOB: 1955  Report Period: 04/12/2025 - 05/09/2025 (28 days)  Generated: 05/09/2025  Time CGM Active: 54%      Glucose Statistics and Targets  Average Glucose: 157 mg/dL  Glucose Management Indicator (GMI): 7.1%  Glucose Variability (%CV): 31.8%  Target Range: 70 - 180 mg/dL      Time in Ranges  Very High: >250 mg/dL --- 4%  High: 181 - 250 mg/dL --- 26%  Target Range: 70 - 180 mg/dL --- 70%  Low: 54 - 69 mg/dL --- 0%  Very Low: <54 mg/dL --- 0%

## 2025-05-09 NOTE — PROGRESS NOTES
FU VISIT     CHIEF COMPLAINT:    Chief Complaint   Patient presents with    Diabetes     Follow-Up        HISTORY OF PRESENT ILLNESS:   Bg Johnson is a 69 year old male who is here to FU for DM.     DM HISTORY  Diagnosed: > 20 years ago      HISTORY OF DIABETES COMPLICATIONS: :  History of Retinopathy: denies, has cataracts - last eye exam :  June 2024  History of Neuropathy: denies  History of Nephropathy: last Ur MA was high .    ASSOCIATED COMPLICATIONS:   HTN: yes  Hyperlipidemia: yes  Coronary Artery Disease:  denies  Cerebrovascular Disease: denies      HOME GLUCOSE READINGS:   Amena download reviewed      CURRENT DIABETIC MEDICATIONS INCLUDE:  MTF 1000 mg BF and dinner  Farxiga 10 mg daily   Tresiba 18 am  and 20 mg pm   Glipizide 2.5 mg with BF     He has been on byetta in the past   Was prescribed ozempic in the past --> stopped since he did not feel good on it      MEALS:  Good compliance with meals  Tries to decrease carb content of meals    EXERCISE:  Works out a lot , but limited now due to recent wrist fracture      CURRENT MEDICATIONS:    Current Outpatient Medications   Medication Sig Dispense Refill    glipiZIDE 5 MG Oral Tab Take 1 tablet (5 mg total) by mouth daily with breakfast. 90 tablet 0    alendronate 70 MG Oral Tab Take 1 tablet (70 mg total) by mouth every 7 days. 12 tablet 1    Continuous Glucose Sensor (FREESTYLE AMENA 2 SENSOR) Does not apply Misc Apply 1 each topically every 14 (fourteen) days. 6 each 1    Lovastatin 20 MG Oral Tab Take 2 tablets (40 mg total) by mouth nightly. 180 tablet 3    POTASSIUM CHLORIDE OR Take by mouth. Once daily      insulin degludec (TRESIBA FLEXTOUCH) 100 UNIT/ML Subcutaneous Solution Pen-injector Inject 18 Units into the skin in the morning and 18 Units before bedtime. 32.4 mL 0    dapagliflozin 10 MG Oral Tab Farxiga 10 mg tablet. TAKE 1 TABLET BY MOUTH ONCE DAILY 90 tablet 1    metFORMIN HCl 1000 MG Oral Tab Take 1 tablet (1,000 mg total) by mouth 2  (two) times daily with meals. 180 tablet 1    cyclobenzaprine 5 MG Oral Tab Take 1 tablet (5 mg total) by mouth 3 (three) times daily as needed for Muscle spasms. 30 tablet 0    meclizine 12.5 MG Oral Tab Take 1 tablet (12.5 mg total) by mouth 3 (three) times daily as needed for Dizziness. 30 tablet 0    lisinopril 10 MG Oral Tab Take 1 tablet (10 mg total) by mouth daily. 90 tablet 3    Cyanocobalamin (B-12 OR) Take by mouth.      aspirin 81 MG Oral Tab EC Take 1 tablet (81 mg total) by mouth daily.         PAST MEDICAL HISTORY:   Past Medical History:    Diabetes ([facility])    Diabetes 1.5, managed as type 2 ([facility])    Essential hypertension    Hyperlipidemia       PAST SURGICAL HISTORY:   Past Surgical History:   Procedure Laterality Date    Colonoscopy      in [place]    Egd      25 yrs ago at [facility]       ALLERGIES:  No Known Allergies    SOCIAL HISTORY:    Social History     Socioeconomic History    Marital status: Single   Tobacco Use    Smoking status: Never    Smokeless tobacco: Never   Vaping Use    Vaping status: Never Used   Substance and Sexual Activity    Alcohol use: Never    Drug use: Never       FAMILY HISTORY:   Family History   Problem Relation Age of Onset    Heart Disease Mother         late 80s    Diabetes Mother     Other (Other) Father         blood disorder: AMDS    Diabetes Maternal Grandmother        ASSESSMENTS:        REVIEW OF SYSTEMS:  Constitutional: Negative for: weight change, fever, fatigue, cold/heat intolerance  Eyes: Negative for:  Visual changes, proptosis, blurring, floaters, poor night vision, impaired color vision  ENT: Negative for:  dysphagia, neck swelling, dysphonia  Respiratory: Negative for:  dyspnea, cough  Cardiovascular: Negative for:  chest pain, palpitations, orthopnea  GI: Negative for:  abdominal pain, nausea, vomiting, diarrhea, constipation, bleeding  Neurology: Negative for: headache, numbness, weakness,   Genito-Urinary: Negative for: dysuria,  frequency  Psychiatric: Negative for:  depression, anxiety  Hematology/Lymphatics: Negative for: bruising, lower extremity edema  Endocrine: Negative for: polyuria, polydypsia  Skin: Negative for: rash, blister,      PHYSICAL EXAM:   Vitals:    05/09/25 0849   BP: 133/80   BP Location: Left arm   Pulse: 83   Resp: 18   Weight: 215 lb 12.8 oz (97.9 kg)   Height: 5' 9\" (1.753 m)     BMI: Body mass index is 31.87 kg/m².         General Appearance:  alert, well developed, in no acute distress  Head: Atraumatic  Eyes:  normal conjunctivae, sclera., normal sclera and normal pupils  Throat/Neck: normal sound to voice. Normal hearing, normal speech  Respiratory:  Speaking in full sentences, non-labored. no increased work of breathing, no audible wheezing    Neuro: motor grossly intact, moving all extremities without difficulty  Psychiatric:  oriented to time, self, and place    DIABETIC FOOT EXAM: May 2025   Bilateral barefoot skin diabetic exam is normal, visualized feet and the appearance is normal.  Bilateral monofilament/sensation of both feet is normal.  Pulsation pedal pulse exam of both lower legs/feet is normal as well.          DATA:     Pertinent data reviewed  A1c is 7.2 % ( 5/2025)     ASSESSMENT AND PLAN:    1. Type 2 DM: with hyperglycemia     Plan:  Discussed the pathogenesis, natural course of diabetes. Patient understands the importance of glycemic control and the implications of uncontrolled diabetes including Diabetic ketoacidosis and various micro vascular and macrovascular complications.        a). Medications:    Continuous Glucose Monitoring Interpretation    Bg Johnson has undergone continuous glucose monitoring with the personal dexcom. He was evaluated with the eloisa from  04/12/2025 - 05/09/2025 (28 days)  Generated: 05/09/2025  Time CGM Active: 54%        Glucose Statistics and Targets  Average Glucose: 157 mg/dL  Glucose Management Indicator (GMI): 7.1%  Glucose Variability (%CV):  31.8%  Target Range: 70 - 180 mg/dL        Time in Ranges  Very High: >250 mg/dL --- 4%  High: 181 - 250 mg/dL --- 26%  Target Range: 70 - 180 mg/dL --- 70%  Low: 54 - 69 mg/dL --- 0%  Very Low: <54 mg/dL --- 0%     As a result of his testing the following plan was made:       Glipizide 2.5 mg--> 5 mg with BF only   Take with food  Reviewed risk of hypoglycemia    Metformin 1000 mg with BF and dinner  Take with food  GI SE reviewed      Farxiga 10 mg daily   Discussed side effects including UTI and fungal infections, skin, genital infections.   Discussed the importance of hydration.      Tresiba 18 am and 20 pm --> 16 am and 16 am     Check and call with sugars as discussed   To call if he has low BG under 80    b). Urine microalbumin /creatinine high on recent labs. Will work on BP and BG control Will repeat level  c). Discussed importance of annual eye exams  d). Foot exam: Daily feet exam explained  e). BG log maintainence explained in great detail, to get log and glucometer on next visit.  f). Life style changes: Diet: low carbohydrate diet discussed, Exercise: should target a weight loss of 7% and increase exercise to at least 150min a week.  g). Hypoglycemia recognition and management discussed    2. Patient’s BP is normal today  3. Dyslipidemia  A) Discussed lifestyle modifications including reductions in dietary total and saturated fat, weight loss, aerobic exercise, and eating a diet rich in fruits and vegetables.  B) Statin therapy  Discussed the potential side effects of statins including muscle and liver injury.  4. Wrist fracture  Fell from standing height . Slipped on ice  Healed now   DXA 2025 : Osteopenia  CONCLUSION:   1. Osteopenia, which according to World Health Organization criteria places the patient at a mild-to-moderately increased risk for fracture.      2. Based on left femoral neck bone mineral density, the FRAX 10 year probability of a major osteoporotic fracture is 6.0% and the 10 year  probability of a hip fracture is 1.1%.   In light of osteopenia and wrist fracture will start Rx with fosamax  Fosamax 70 mg weekly   FOSAMAX    Contraindications:   Abnormalities of the esophagus which delay emptying such as   stricture or achalasia  Inability to stand/sit upright for at least 30 minutes  Patients at increased risk of aspiration  Hypocalcemia   Hypersensitivity to any component of this product     Warnings:   Severe irritation of upper gastrointestinal (GI) mucosa can occur.   Hypocalcemia can worsen and must be corrected prior to use.  Severe bone, joint, muscle pain may occur. Discontinue use if   severe symptoms develop.   Osteonecrosis of the jaw has been reported.   Atypical femur fractures have been reported. Contact the clinic if you develop new thigh or   groin pain to rule out an incomplete femoral fracture.     Adverse reactions:   Most common adverse reactions (>=3%) are abdominal pain, acid   regurgitation, constipation, diarrhea, dyspepsia, musculoskeletal pain,   nausea.     Administration:   Must be taken with 6-8 oz plain water at least 30 minutes before the   first food, drink, or medication of the day; do not lie down for at least   30 minutes after taking FOSAMAX and until after food.    Vitamin D : was high, repeat  he is off Vit D replacement     RTC in 3-4 months  Check and call with sugars as discussed    Orders Placed This Encounter   Procedures    POC Glycohemoglobin [50130]    POC HemoCue Glucose 201 (Finger stick glucose)    Vitamin D [E]    Alk Phosphatase, Bone Specific           Bettye Mora MD

## 2025-05-12 RX ORDER — CYCLOBENZAPRINE HCL 5 MG
5 TABLET ORAL 3 TIMES DAILY PRN
Qty: 60 TABLET | Refills: 1 | Status: SHIPPED | OUTPATIENT
Start: 2025-05-12

## 2025-06-10 RX ORDER — GLIPIZIDE 5 MG/1
5 TABLET ORAL
Qty: 90 TABLET | Refills: 0 | Status: SHIPPED | OUTPATIENT
Start: 2025-06-10

## 2025-06-10 NOTE — TELEPHONE ENCOUNTER
Endocrine Refill protocol for oral and injectable diabetic medications    Protocol Criteria:  PASSED  Reason: N/A    If all below requirements are met, send a 90-day supply with 1 refill per provider protocol.    Verify appointment with Endocrinology completed in the last 6 months or scheduled in the next 3 months.  Verify A1C has been completed within the last 6 months and is below 8.5%     Last completed office visit: 5/9/2025 Bettye Mora MD   Next scheduled Follow up: No future appointments.   Last A1c result: Last A1c value was 7.2% done 5/9/2025.

## 2025-08-02 ENCOUNTER — PATIENT MESSAGE (OUTPATIENT)
Dept: ENDOCRINOLOGY CLINIC | Facility: CLINIC | Age: 70
End: 2025-08-02

## 2025-08-02 DIAGNOSIS — E11.69 TYPE 2 DIABETES MELLITUS WITH OTHER SPECIFIED COMPLICATION, WITHOUT LONG-TERM CURRENT USE OF INSULIN (HCC): Primary | ICD-10-CM

## 2025-08-02 DIAGNOSIS — E11.69 TYPE 2 DIABETES MELLITUS WITH OTHER SPECIFIED COMPLICATION, UNSPECIFIED WHETHER LONG TERM INSULIN USE (HCC): ICD-10-CM

## 2025-08-04 RX ORDER — INSULIN DEGLUDEC 100 U/ML
INJECTION, SOLUTION SUBCUTANEOUS
Qty: 32 ML | Refills: 0 | Status: SHIPPED | OUTPATIENT
Start: 2025-08-04

## 2025-08-05 RX ORDER — DAPAGLIFLOZIN 10 MG/1
TABLET, FILM COATED ORAL
Qty: 90 TABLET | Refills: 1 | Status: SHIPPED | OUTPATIENT
Start: 2025-08-05

## (undated) NOTE — Clinical Note
Thank you for the consult. I saw Mr Johnson in the endocrine/diabetes clinic today. Please see attached my note. Please feel free to contact me with any questions. Thanks!